# Patient Record
Sex: FEMALE | Race: WHITE | Employment: FULL TIME | URBAN - METROPOLITAN AREA
[De-identification: names, ages, dates, MRNs, and addresses within clinical notes are randomized per-mention and may not be internally consistent; named-entity substitution may affect disease eponyms.]

---

## 2018-11-29 ENCOUNTER — OFFICE VISIT (OUTPATIENT)
Dept: FAMILY MEDICINE CLINIC | Age: 53
End: 2018-11-29

## 2018-11-29 VITALS
WEIGHT: 218.2 LBS | DIASTOLIC BLOOD PRESSURE: 116 MMHG | SYSTOLIC BLOOD PRESSURE: 180 MMHG | HEIGHT: 64 IN | BODY MASS INDEX: 37.25 KG/M2 | RESPIRATION RATE: 16 BRPM | HEART RATE: 84 BPM | TEMPERATURE: 97.7 F | OXYGEN SATURATION: 96 %

## 2018-11-29 DIAGNOSIS — R20.2 PARESTHESIA OF FINGER: ICD-10-CM

## 2018-11-29 DIAGNOSIS — R20.2 PARESTHESIA OF LEFT LEG: ICD-10-CM

## 2018-11-29 DIAGNOSIS — I16.0 HYPERTENSIVE URGENCY: ICD-10-CM

## 2018-11-29 DIAGNOSIS — I16.0 HYPERTENSIVE URGENCY: Primary | ICD-10-CM

## 2018-11-29 DIAGNOSIS — Z23 ENCOUNTER FOR IMMUNIZATION: ICD-10-CM

## 2018-11-29 PROBLEM — E66.01 SEVERE OBESITY (HCC): Status: ACTIVE | Noted: 2018-11-29

## 2018-11-29 RX ORDER — PREDNISONE 50 MG/1
50 TABLET ORAL
Qty: 7 TAB | Refills: 0 | Status: SHIPPED | OUTPATIENT
Start: 2018-11-29 | End: 2018-12-12 | Stop reason: ALTCHOICE

## 2018-11-29 RX ORDER — CLONIDINE HYDROCHLORIDE 0.1 MG/1
0.1 TABLET ORAL ONCE
Qty: 1 TAB | Refills: 0
Start: 2018-11-29 | End: 2018-11-29

## 2018-11-29 RX ORDER — AMLODIPINE BESYLATE 5 MG/1
5 TABLET ORAL DAILY
Qty: 30 TAB | Refills: 2 | Status: SHIPPED | OUTPATIENT
Start: 2018-11-29 | End: 2018-12-12 | Stop reason: DRUGHIGH

## 2018-11-29 NOTE — PROGRESS NOTES
HISTORY OF PRESENT ILLNESS Bridger Ruiz is a 52 y.o. female. Ms. Marques Burgos is a 51 y/o female with a PMHx significant for headaches who presents to the clinic with HTN and tingling in her left hand and foot. Six days ago she experienced dizziness and 2 episodes of emesis. Afterwards, she began to notice the tingling on the plantar side of her L foot and along the radial nerve in her L hand. The tingling progressed in her L foot so that it now travel up to her foot. She currently has no N/V, no dizziness, no weakness, no loss of sensation, and no back pain. She takes no medications, has never smoked, and doesn't drink alcohol. She hasn't seen a doctor in years. Review of Systems Constitutional: Negative for chills and fever. Eyes: Negative for blurred vision and double vision. Respiratory: Negative for cough and shortness of breath. Cardiovascular: Negative for chest pain and palpitations. Gastrointestinal: Negative for abdominal pain, nausea and vomiting. Neurological: Positive for tingling. Negative for focal weakness, seizures and headaches. Psychiatric/Behavioral: Negative for depression and suicidal ideas. Physical Exam  
Constitutional: Vital signs are normal. She appears well-developed and well-nourished. HENT:  
Right Ear: Tympanic membrane and ear canal normal.  
Left Ear: Tympanic membrane and ear canal normal.  
Nose: Nose normal.  
Mouth/Throat: Uvula is midline, oropharynx is clear and moist and mucous membranes are normal.  
Eyes: Pupils are equal, round, and reactive to light. Cardiovascular: Normal rate and regular rhythm. Pulmonary/Chest: Effort normal and breath sounds normal. No respiratory distress. She has no wheezes. She has no rales. Abdominal: She exhibits no distension. Musculoskeletal:  
Tender lower lumbar spine and muscles. Neg SLR Neurological: No cranial nerve deficit. Coordination normal.  
Neg phalens and tinels Skin: No rash noted. Nursing note and vitals reviewed. Clonidine 0.1 mg given from stock bottle, BP coming down. Will get BP under control then investigate the tingling if it persists ASSESSMENT and PLAN 
  ICD-10-CM ICD-9-CM 1. Hypertensive urgency I30.6 958.0 METABOLIC PANEL, COMPREHENSIVE  
   TSH 3RD GENERATION  
   T4, FREE  
   LIPID PANEL 2. Paresthesia of finger Z16.0 316.2 METABOLIC PANEL, COMPREHENSIVE  
   predniSONE (DELTASONE) 50 mg tablet 3. Paresthesia of left leg W80.0 483.2 METABOLIC PANEL, COMPREHENSIVE  
   predniSONE (DELTASONE) 50 mg tablet 4.  Encounter for immunization Z23 V03.89 INFLUENZA VIRUS VAC QUAD,SPLIT,PRESV FREE SYRINGE IM  
   CT IMMUNIZ ADMIN,1 SINGLE/COMB VAC/TOXOID

## 2018-11-29 NOTE — PROGRESS NOTES
Rm 2 
Patient presents to the clinic c/o numbness and tingling of left foot and left hand mostly fingertips which all started x 1 week ago but x 1 day ago feeling it on pelvic bone Depression Screening: PHQ over the last two weeks 11/29/2018 Little interest or pleasure in doing things Not at all Feeling down, depressed, irritable, or hopeless Not at all Total Score PHQ 2 0 Learning Assessment: 
Learning Assessment 11/29/2018 PRIMARY LEARNER Patient HIGHEST LEVEL OF EDUCATION - PRIMARY LEARNER  4 YEARS OF COLLEGE  
BARRIERS PRIMARY LEARNER NONE  
CO-LEARNER CAREGIVER No  
PRIMARY LANGUAGE ENGLISH  
LEARNER PREFERENCE PRIMARY DEMONSTRATION  
ANSWERED BY patient RELATIONSHIP SELF Abuse Screening: No flowsheet data found. Health Maintenance reviewed and discussed per provider: yes Coordination of Care: 1. Have you been to the ER, urgent care clinic since your last visit? Hospitalized since your last visit? no 
 
2. Have you seen or consulted any other health care providers outside of the [address] [place] [address] since your last visit? Include any pap smears or colon screening. No 
 
Does patient want flu shot? Yes Patient received influenza vaccine 0.5ml in right deltoid. Tolerated well. No signs or symptoms of distress noted. Most current VIS given and consent signed. Clonidine 0.1 mg PO taking by patient at 0859. Blood pressure rechecked manually at 0909.

## 2018-11-29 NOTE — PATIENT INSTRUCTIONS
For the BP, go to your pharmacy and  the prescription for Norvasc and take one immediately, then every morning. If dizziness, headache occur, go to ER or urgent care center ([facility] office on [address] is open on Saturdays if you ever need to be seen on a Saturday. Their hours are 8:30-5) For the tingling, take prednisone every morning for 7 days. Recheck Monday to review BP and lab results.

## 2018-11-30 LAB
A-G RATIO,AGRAT: 1.6 RATIO (ref 1.1–2.6)
ALBUMIN SERPL-MCNC: 4.6 G/DL (ref 3.5–5)
ALP SERPL-CCNC: 114 U/L (ref 25–115)
ALT SERPL-CCNC: 32 U/L (ref 5–40)
ANION GAP SERPL CALC-SCNC: 17 MMOL/L
AST SERPL W P-5'-P-CCNC: 30 U/L (ref 10–37)
BILIRUB SERPL-MCNC: 0.4 MG/DL (ref 0.2–1.2)
BUN SERPL-MCNC: 10 MG/DL (ref 6–22)
CALCIUM SERPL-MCNC: 9.6 MG/DL (ref 8.4–10.5)
CHLORIDE SERPL-SCNC: 97 MMOL/L (ref 98–110)
CHOLEST SERPL-MCNC: 228 MG/DL (ref 110–200)
CO2 SERPL-SCNC: 26 MMOL/L (ref 20–32)
CREAT SERPL-MCNC: 0.6 MG/DL (ref 0.5–1.2)
GFRAA, 66117: >60
GFRNA, 66118: >60
GLOBULIN,GLOB: 2.9 G/DL (ref 2–4)
GLUCOSE SERPL-MCNC: 251 MG/DL (ref 70–99)
HDLC SERPL-MCNC: 3.9 MG/DL (ref 0–5)
HDLC SERPL-MCNC: 59 MG/DL (ref 40–59)
LDLC SERPL CALC-MCNC: 131 MG/DL (ref 50–99)
POTASSIUM SERPL-SCNC: 4.3 MMOL/L (ref 3.5–5.5)
PROT SERPL-MCNC: 7.5 G/DL (ref 6.4–8.3)
SODIUM SERPL-SCNC: 140 MMOL/L (ref 133–145)
T4 FREE SERPL-MCNC: 1.3 NG/DL (ref 0.9–1.8)
TRIGL SERPL-MCNC: 191 MG/DL (ref 40–149)
TSH SERPL DL<=0.005 MIU/L-ACNC: 2.09 MCU/ML (ref 0.27–4.2)
VLDLC SERPL CALC-MCNC: 38 MG/DL (ref 8–30)

## 2018-12-03 ENCOUNTER — OFFICE VISIT (OUTPATIENT)
Dept: FAMILY MEDICINE CLINIC | Age: 53
End: 2018-12-03

## 2018-12-03 VITALS
HEART RATE: 104 BPM | DIASTOLIC BLOOD PRESSURE: 110 MMHG | OXYGEN SATURATION: 97 % | WEIGHT: 219 LBS | BODY MASS INDEX: 37.39 KG/M2 | HEIGHT: 64 IN | TEMPERATURE: 98.1 F | SYSTOLIC BLOOD PRESSURE: 180 MMHG | RESPIRATION RATE: 20 BRPM

## 2018-12-03 DIAGNOSIS — I10 UNCONTROLLED HYPERTENSION: ICD-10-CM

## 2018-12-03 DIAGNOSIS — E66.01 SEVERE OBESITY (HCC): ICD-10-CM

## 2018-12-03 DIAGNOSIS — I16.0 HYPERTENSIVE URGENCY: Primary | ICD-10-CM

## 2018-12-03 DIAGNOSIS — E11.65 UNCONTROLLED TYPE 2 DIABETES MELLITUS WITH HYPERGLYCEMIA (HCC): ICD-10-CM

## 2018-12-03 LAB — HBA1C MFR BLD HPLC: 9.7 %

## 2018-12-03 RX ORDER — CLONIDINE HYDROCHLORIDE 0.1 MG/1
0.1 TABLET ORAL ONCE
Qty: 1 TAB | Refills: 0
Start: 2018-12-03 | End: 2018-12-03

## 2018-12-03 RX ORDER — CLONIDINE HYDROCHLORIDE 0.1 MG/1
0.1 TABLET ORAL 2 TIMES DAILY
Qty: 60 TAB | Refills: 1 | Status: SHIPPED | OUTPATIENT
Start: 2018-12-03 | End: 2018-12-12 | Stop reason: DRUGHIGH

## 2018-12-03 NOTE — PROGRESS NOTES
HISTORY OF PRESENT ILLNESS Ousmane James is a 52 y.o. female. Ms. Milli Cheney is here for follow up of her abnormal labs and extremely high BP. She is taking the amlodipine as prescribed. She has no headache, blurred vision, dizziness but still has the tingling in the side of her hand. Kidney function tests were normal last week, but blood sugar was 251. She denies increased thirst. 
 
 
 
Review of Systems Constitutional: Negative for chills and fever. Eyes: Negative for blurred vision and double vision. Respiratory: Negative for cough and shortness of breath. Cardiovascular: Negative for chest pain and palpitations. Gastrointestinal: Negative for abdominal pain, nausea and vomiting. Neurological: Negative for headaches. Psychiatric/Behavioral: Negative for depression and suicidal ideas. Physical Exam  
Constitutional: Vital signs are normal. She appears well-developed and well-nourished. HENT:  
Right Ear: Tympanic membrane and ear canal normal.  
Left Ear: Tympanic membrane and ear canal normal.  
Nose: Nose normal.  
Mouth/Throat: Uvula is midline, oropharynx is clear and moist and mucous membranes are normal.  
Eyes: Pupils are equal, round, and reactive to light. Neck: No thyromegaly present. Cardiovascular: Normal rate and regular rhythm. Pulmonary/Chest: Effort normal and breath sounds normal. No respiratory distress. She has no wheezes. She has no rales. Abdominal: She exhibits no distension. Lymphadenopathy:  
  She has no cervical adenopathy. Skin: No rash noted. Psychiatric: She has a normal mood and affect. Her behavior is normal.  
Nursing note and vitals reviewed. Results for orders placed or performed in visit on 12/03/18 AMB POC HEMOGLOBIN A1C Result Value Ref Range Hemoglobin A1c (POC) 9.7 % ASSESSMENT and PLAN 
  ICD-10-CM ICD-9-CM 1. Hypertensive urgency I16.0 401.9 2. Uncontrolled hypertension I10 401.9 REFERRAL TO CARDIOLOGY 3. Uncontrolled type 2 diabetes mellitus with hyperglycemia ([facility]) E11.65 250.02 AMB POC HEMOGLOBIN A1C  
4. Severe obesity ([facility]) E66.01 278.01 Clonidine given, BP down 15 minutes after. See orders.

## 2018-12-03 NOTE — PATIENT INSTRUCTIONS
Increase amlodipine to 10 mg every morning and start clonidine 0.1 mg twice daily this evening. I am also referring you to cardiology because of the BP being so high, and we don't know how long it's been like that. For the diabetes, begin [place] every morning. Recheck 1 week.

## 2018-12-03 NOTE — PROGRESS NOTES
Pt:1 
 
Chief Complaint Patient presents with  
 Hand Pain  
  pt was seen on 11/29 for tingling in left hand, pt states that hand is no better today  Hypertension Depression Screening: PHQ over the last two weeks 12/3/2018 11/29/2018 Little interest or pleasure in doing things Not at all Not at all Feeling down, depressed, irritable, or hopeless Not at all Not at all Total Score PHQ 2 0 0 Learning Assessment: 
Learning Assessment 11/29/2018 PRIMARY LEARNER Patient HIGHEST LEVEL OF EDUCATION - PRIMARY LEARNER  4 YEARS OF COLLEGE  
BARRIERS PRIMARY LEARNER NONE  
CO-LEARNER CAREGIVER No  
PRIMARY LANGUAGE ENGLISH  
LEARNER PREFERENCE PRIMARY DEMONSTRATION  
ANSWERED BY patient RELATIONSHIP SELF Abuse Screening: No flowsheet data found. Health Maintenance reviewed and discussed per provider: yes Coordination of Care: 1. Have you been to the ER, urgent care clinic since your last visit? Hospitalized since your last visit? no 
 
2. Have you seen or consulted any other health care providers outside of the [facility] since your last visit? Include any pap smears or colon screening. no 
 
 
Pt was given 0.1mg Clonidine in office Lot # [id number] PIN#[id number]

## 2018-12-05 ENCOUNTER — DOCUMENTATION ONLY (OUTPATIENT)
Dept: FAMILY MEDICINE CLINIC | Age: 53
End: 2018-12-05

## 2018-12-05 NOTE — PROGRESS NOTES
Prior Authorization for Lynn Ramirez started on 12/5/2018 via covermymeds. Awaiting reply from pt's insurance company via fax/e-mail. Allow 48-72 hours.

## 2018-12-06 ENCOUNTER — TELEPHONE (OUTPATIENT)
Dept: FAMILY MEDICINE CLINIC | Age: 53
End: 2018-12-06

## 2018-12-06 NOTE — TELEPHONE ENCOUNTER
Stated that she went to the pharmacy to  the prescription of Georgesbebo Monaes and they told her that it needed to get approved or prior auth. Patient wanted to let us know so that she can get her medication.

## 2018-12-12 ENCOUNTER — OFFICE VISIT (OUTPATIENT)
Dept: FAMILY MEDICINE CLINIC | Age: 53
End: 2018-12-12

## 2018-12-12 VITALS
TEMPERATURE: 98.2 F | SYSTOLIC BLOOD PRESSURE: 165 MMHG | DIASTOLIC BLOOD PRESSURE: 97 MMHG | HEART RATE: 93 BPM | BODY MASS INDEX: 36.54 KG/M2 | OXYGEN SATURATION: 99 % | RESPIRATION RATE: 18 BRPM | WEIGHT: 214 LBS | HEIGHT: 64 IN

## 2018-12-12 DIAGNOSIS — I10 ESSENTIAL HYPERTENSION: Primary | ICD-10-CM

## 2018-12-12 DIAGNOSIS — E11.65 UNCONTROLLED TYPE 2 DIABETES MELLITUS WITH HYPERGLYCEMIA (HCC): ICD-10-CM

## 2018-12-12 DIAGNOSIS — E66.01 SEVERE OBESITY (HCC): ICD-10-CM

## 2018-12-12 RX ORDER — BLOOD-GLUCOSE METER
EACH MISCELLANEOUS
Qty: 1 EACH | Refills: 0 | Status: SHIPPED | OUTPATIENT
Start: 2018-12-12

## 2018-12-12 RX ORDER — METFORMIN HYDROCHLORIDE 500 MG/1
500 TABLET ORAL 2 TIMES DAILY WITH MEALS
Qty: 60 TAB | Refills: 2 | Status: SHIPPED | OUTPATIENT
Start: 2018-12-12 | End: 2019-03-05 | Stop reason: SDUPTHER

## 2018-12-12 RX ORDER — AMLODIPINE BESYLATE 10 MG/1
10 TABLET ORAL
Qty: 90 TAB | Refills: 1 | Status: SHIPPED | OUTPATIENT
Start: 2018-12-12 | End: 2019-04-22 | Stop reason: SDUPTHER

## 2018-12-12 RX ORDER — CLONIDINE HYDROCHLORIDE 0.2 MG/1
0.2 TABLET ORAL 2 TIMES DAILY
Qty: 60 TAB | Refills: 2 | Status: SHIPPED | OUTPATIENT
Start: 2018-12-12 | End: 2019-03-05 | Stop reason: SDUPTHER

## 2018-12-12 NOTE — PROGRESS NOTES
HISTORY OF PRESENT ILLNESS  Theodora Blakely is a 52 y.o. female. Ms. Krista Healy is here for follow up of her BP, and T2D. She has been taking 10 mg of Amlodipine daily and Clonidine 0.1 mg BID for her BP. She hasn't checked her BP anywhere though. It's better today than it has been, but still not near goal.   My nurse sent the prior auth for Umberto Thomas to her pharmacy a week ago, she still hasn't heard anything from them. She was asked to call her insurance company. I haven't ordered her a meter yet, will do that now. Review of Systems   Constitutional: Negative for chills and fever. HENT: Negative for congestion, ear pain and sore throat. Eyes: Negative for discharge and redness. Respiratory: Negative for cough and shortness of breath. Cardiovascular: Negative for chest pain, palpitations and orthopnea. Gastrointestinal: Negative for abdominal pain, nausea and vomiting. Genitourinary: Negative for frequency and urgency. Skin: Negative for rash. Neurological: Negative for weakness and headaches. Endo/Heme/Allergies: Does not bruise/bleed easily. Physical Exam   Constitutional: Vital signs are normal. She appears well-developed and well-nourished. HENT:   Right Ear: Tympanic membrane and ear canal normal.   Left Ear: Tympanic membrane and ear canal normal.   Nose: Nose normal.   Mouth/Throat: Uvula is midline, oropharynx is clear and moist and mucous membranes are normal.   Eyes: Pupils are equal, round, and reactive to light. Neck: No thyromegaly present. Cardiovascular: Normal rate, regular rhythm and normal heart sounds. Pulmonary/Chest: Effort normal and breath sounds normal. No respiratory distress. She has no wheezes. She has no rales. Abdominal: She exhibits no distension. There is no tenderness. Lymphadenopathy:     She has no cervical adenopathy. Skin: No rash noted. Psychiatric: Her behavior is normal.   Nursing note and vitals reviewed.       ASSESSMENT and PLAN    ICD-10-CM ICD-9-CM    1. Essential hypertension I10 401.9    2. Uncontrolled type 2 diabetes mellitus with hyperglycemia (HCC) E11.65 250.02 Blood-Glucose Meter (CONTOUR NEXT METER) misc      glucose blood VI test strips (CONTOUR NEXT TEST STRIPS) strip      metFORMIN (GLUCOPHAGE) 500 mg tablet   3. Severe obesity ([address] E66.01 278.01        Will increase Clonidine and work on getting her [place], in addition to Metformin.  She was advised to increase exercise and info about diabetic diet given to her

## 2018-12-12 NOTE — PROGRESS NOTES
Rm:1    Chief Complaint   Patient presents with    Diabetes    Hypertension     Depression Screening:  PHQ over the last two weeks 12/12/2018 12/3/2018 11/29/2018   Little interest or pleasure in doing things Not at all Not at all Not at all   Feeling down, depressed, irritable, or hopeless Not at all Not at all Not at all   Total Score PHQ 2 0 0 0       Learning Assessment:  Learning Assessment 11/29/2018   PRIMARY LEARNER Patient   HIGHEST LEVEL OF EDUCATION - PRIMARY LEARNER  4 YEARS OF COLLEGE   BARRIERS PRIMARY LEARNER NONE   CO-LEARNER CAREGIVER No   PRIMARY LANGUAGE ENGLISH   LEARNER PREFERENCE PRIMARY DEMONSTRATION   ANSWERED BY patient   RELATIONSHIP SELF       Abuse Screening:  No flowsheet data found. Health Maintenance reviewed and discussed per provider: yes     Coordination of Care:    1. Have you been to the ER, urgent care clinic since your last visit? Hospitalized since your last visit? no    2. Have you seen or consulted any other health care providers outside of the [address] [place] [address] since your last visit? Include any pap smears or colon screening.  no

## 2018-12-12 NOTE — PATIENT INSTRUCTIONS
I am increasing your clonidine to 0.2 mg twice daily. Save your 0.1 pills though, if your BP comes down once you start Aristides Escalante, we will cut you back to 0.1 again. Work on getting some exercise in your daily routine. Walking is fine. Find a pharmacy with a BP cuff and check your BP twice a week-write down the readings for me. Recheck if your bottom number isn't coming down below 100 the majority of the time. If your BP is fine, recheck 6 weeks after starting Farxiga. I am also putting you on a medication called metformin for the diabetes. Take this twice daily, before breakfast and dinner         Learning About Diabetes Food Guidelines  Your Care Instructions    Meal planning is important to manage diabetes. It helps keep your blood sugar at a target level (which you set with your doctor). You don't have to eat special foods. You can eat what your family eats, including sweets once in a while. But you do have to pay attention to how often you eat and how much you eat of certain foods. You may want to work with a dietitian or a certified diabetes educator (CDE) to help you plan meals and snacks. A dietitian or CDE can also help you lose weight if that is one of your goals. What should you know about eating carbs? Managing the amount of carbohydrate (carbs) you eat is an important part of healthy meals when you have diabetes. Carbohydrate is found in many foods. · Learn which foods have carbs. And learn the amounts of carbs in different foods. ? Bread, cereal, pasta, and rice have about 15 grams of carbs in a serving. A serving is 1 slice of bread (1 ounce), ½ cup of cooked cereal, or 1/3 cup of cooked pasta or rice. ? Fruits have 15 grams of carbs in a serving. A serving is 1 small fresh fruit, such as an apple or orange; ½ of a banana; ½ cup of cooked or canned fruit; ½ cup of fruit juice; 1 cup of melon or raspberries; or 2 tablespoons of dried fruit.   ? Milk and no-sugar-added yogurt have 15 grams of carbs in a serving. A serving is 1 cup of milk or 2/3 cup of no-sugar-added yogurt. ? Starchy vegetables have 15 grams of carbs in a serving. A serving is ½ cup of mashed potatoes or sweet potato; 1 cup winter squash; ½ of a small baked potato; ½ cup of cooked beans; or ½ cup cooked corn or green peas. · Learn how much carbs to eat each day and at each meal. A dietitian or CDE can teach you how to keep track of the amount of carbs you eat. This is called carbohydrate counting. · If you are not sure how to count carbohydrate grams, use the Plate Method to plan meals. It is a good, quick way to make sure that you have a balanced meal. It also helps you spread carbs throughout the day. ? Divide your plate by types of foods. Put non-starchy vegetables on half the plate, meat or other protein food on one-quarter of the plate, and a grain or starchy vegetable in the final quarter of the plate. To this you can add a small piece of fruit and 1 cup of milk or yogurt, depending on how many carbs you are supposed to eat at a meal.  · Try to eat about the same amount of carbs at each meal. Do not \"save up\" your daily allowance of carbs to eat at one meal.  · Proteins have very little or no carbs per serving. Examples of proteins are beef, chicken, turkey, fish, eggs, tofu, cheese, cottage cheese, and peanut butter. A serving size of meat is 3 ounces, which is about the size of a deck of cards. Examples of meat substitute serving sizes (equal to 1 ounce of meat) are 1/4 cup of cottage cheese, 1 egg, 1 tablespoon of peanut butter, and ½ cup of tofu. How can you eat out and still eat healthy? · Learn to estimate the serving sizes of foods that have carbohydrate. If you measure food at home, it will be easier to estimate the amount in a serving of restaurant food. · If the meal you order has too much carbohydrate (such as potatoes, corn, or baked beans), ask to have a low-carbohydrate food instead.  Ask for a salad or green vegetables. · If you use insulin, check your blood sugar before and after eating out to help you plan how much to eat in the future. · If you eat more carbohydrate at a meal than you had planned, take a walk or do other exercise. This will help lower your blood sugar. What else should you know? · Limit saturated fat, such as the fat from meat and dairy products. This is a healthy choice because people who have diabetes are at higher risk of heart disease. So choose lean cuts of meat and nonfat or low-fat dairy products. Use olive or canola oil instead of butter or shortening when cooking. · Don't skip meals. Your blood sugar may drop too low if you skip meals and take insulin or certain medicines for diabetes. · Check with your doctor before you drink alcohol. Alcohol can cause your blood sugar to drop too low. Alcohol can also cause a bad reaction if you take certain diabetes medicines. Follow-up care is a key part of your treatment and safety. Be sure to make and go to all appointments, and call your doctor if you are having problems. It's also a good idea to know your test results and keep a list of the medicines you take. Where can you learn more? Go to http://dominick-lachelle.info/. Enter H441 in the search box to learn more about \"Learning About Diabetes Food Guidelines. \"  Current as of: December 7, 2017  Content Version: 11.8  © 3234-3418 [employer]. Care instructions adapted under license by [employer] (which disclaims liability or warranty for this information). If you have questions about a medical condition or this instruction, always ask your healthcare professional. [address] any warranty or liability for your use of this information.

## 2018-12-17 NOTE — TELEPHONE ENCOUNTER
PA was processed on 12/5/18 thru Covermymeds. I received a Curse message stating the pt spoke with [employer] and advised her that they don't accept PA's thru 3rd party and that I needed to fill out a PA form. I contacted [employer] and was on hold for about 30mins before reaching a live agent, when I finally did speak with someone the agent advised me that she would take a message and send to PA rep. I asked if the form could just be faxed and she advised that she wasn't aware of what form I was speaking of, so I have to wait on a return call or either the form in order for the PA to be processed for the [place].  I called pt the advise of status and there was no answer, left  for return call

## 2018-12-18 ENCOUNTER — DOCUMENTATION ONLY (OUTPATIENT)
Dept: FAMILY MEDICINE CLINIC | Age: 53
End: 2018-12-18

## 2018-12-18 NOTE — PROGRESS NOTES
Received form from [place], filled out, had Dr. Ricardo Mendiola sign and date, then re-faxed to [place].     Awaiting approval for the [facility]

## 2019-01-07 ENCOUNTER — OFFICE VISIT (OUTPATIENT)
Dept: CARDIOLOGY CLINIC | Age: 54
End: 2019-01-07

## 2019-01-07 VITALS
WEIGHT: 202 LBS | OXYGEN SATURATION: 99 % | SYSTOLIC BLOOD PRESSURE: 158 MMHG | DIASTOLIC BLOOD PRESSURE: 92 MMHG | BODY MASS INDEX: 34.67 KG/M2 | HEART RATE: 96 BPM

## 2019-01-07 DIAGNOSIS — I10 ESSENTIAL HYPERTENSION: ICD-10-CM

## 2019-01-07 DIAGNOSIS — I10 HYPERTENSION, UNSPECIFIED TYPE: Primary | ICD-10-CM

## 2019-01-07 DIAGNOSIS — E66.01 SEVERE OBESITY (HCC): ICD-10-CM

## 2019-01-07 DIAGNOSIS — E11.8 CONTROLLED TYPE 2 DIABETES MELLITUS WITH COMPLICATION, WITHOUT LONG-TERM CURRENT USE OF INSULIN (HCC): ICD-10-CM

## 2019-01-07 DIAGNOSIS — E78.5 DYSLIPIDEMIA: ICD-10-CM

## 2019-01-07 NOTE — PROGRESS NOTES
1. Have you been to the ER, urgent care clinic since your last visit? Hospitalized since your last visit? No  
 
2. Have you seen or consulted any other health care providers outside of the [address] [place] [address] since your last visit? Include any pap smears or colon screening.   No

## 2019-01-07 NOTE — PATIENT INSTRUCTIONS
Paulette Bruner will call to schedule your testing within 24-48 hours. If you do not hear from her, then please call her directly at [phone]. All testing/lab work is completed in [address] [place] [address] 150  
at [facility] DASH Diet: Care Instructions Your Care Instructions The DASH diet is an eating plan that can help lower your blood pressure. DASH stands for Dietary Approaches to Stop Hypertension. Hypertension is high blood pressure. The DASH diet focuses on eating foods that are high in calcium, potassium, and magnesium. These nutrients can lower blood pressure. The foods that are highest in these nutrients are fruits, vegetables, low-fat dairy products, nuts, seeds, and legumes. But taking calcium, potassium, and magnesium supplements instead of eating foods that are high in those nutrients does not have the same effect. The DASH diet also includes whole grains, fish, and poultry. The DASH diet is one of several lifestyle changes your doctor may recommend to lower your high blood pressure. Your doctor may also want you to decrease the amount of sodium in your diet. Lowering sodium while following the DASH diet can lower blood pressure even further than just the DASH diet alone. Follow-up care is a key part of your treatment and safety. Be sure to make and go to all appointments, and call your doctor if you are having problems. It's also a good idea to know your test results and keep a list of the medicines you take. How can you care for yourself at home? Following the DASH diet · Eat 4 to 5 servings of fruit each day. A serving is 1 medium-sized piece of fruit, ½ cup chopped or canned fruit, 1/4 cup dried fruit, or 4 ounces (½ cup) of fruit juice. Choose fruit more often than fruit juice. · Eat 4 to 5 servings of vegetables each day.  A serving is 1 cup of lettuce or raw leafy vegetables, ½ cup of chopped or cooked vegetables, or 4 ounces (½ cup) of vegetable juice. Choose vegetables more often than vegetable juice. · Get 2 to 3 servings of low-fat and fat-free dairy each day. A serving is 8 ounces of milk, 1 cup of yogurt, or 1 ½ ounces of cheese. · Eat 6 to 8 servings of grains each day. A serving is 1 slice of bread, 1 ounce of dry cereal, or ½ cup of cooked rice, pasta, or cooked cereal. Try to choose whole-grain products as much as possible. · Limit lean meat, poultry, and fish to 2 servings each day. A serving is 3 ounces, about the size of a deck of cards. · Eat 4 to 5 servings of nuts, seeds, and legumes (cooked dried beans, lentils, and split peas) each week. A serving is 1/3 cup of nuts, 2 tablespoons of seeds, or ½ cup of cooked beans or peas. · Limit fats and oils to 2 to 3 servings each day. A serving is 1 teaspoon of vegetable oil or 2 tablespoons of salad dressing. · Limit sweets and added sugars to 5 servings or less a week. A serving is 1 tablespoon jelly or jam, ½ cup sorbet, or 1 cup of lemonade. · Eat less than 2,300 milligrams (mg) of sodium a day. If you limit your sodium to 1,500 mg a day, you can lower your blood pressure even more. Tips for success · Start small. Do not try to make dramatic changes to your diet all at once. You might feel that you are missing out on your favorite foods and then be more likely to not follow the plan. Make small changes, and stick with them. Once those changes become habit, add a few more changes. · Try some of the following: ? Make it a goal to eat a fruit or vegetable at every meal and at snacks. This will make it easy to get the recommended amount of fruits and vegetables each day. ? Try yogurt topped with fruit and nuts for a snack or healthy dessert. ? Add lettuce, tomato, cucumber, and onion to sandwiches. ? Combine a ready-made pizza crust with low-fat mozzarella cheese and lots of vegetable toppings.  Try using tomatoes, squash, spinach, broccoli, carrots, cauliflower, and onions. ? Have a variety of cut-up vegetables with a low-fat dip as an appetizer instead of chips and dip. ? Sprinkle sunflower seeds or chopped almonds over salads. Or try adding chopped walnuts or almonds to cooked vegetables. ? Try some vegetarian meals using beans and peas. Add garbanzo or kidney beans to salads. Make burritos and tacos with mashed leija beans or black beans. Where can you learn more? Go to http://[employer].info/. Enter Z605 in the search box to learn more about \"DASH Diet: Care Instructions. \" Current as of: December 6, 2017 Content Version: 11.8 © 1210-6516 [employer]. Care instructions adapted under license by [employer] (which disclaims liability or warranty for this information). If you have questions about a medical condition or this instruction, always ask your healthcare professional. [address] any warranty or liability for your use of this information.

## 2019-01-07 NOTE — PROGRESS NOTES
Subjective:  
   Pura Delgado is in the office today for cardiac evaluation. She is a 22-year-old woman that was recently diagnosed with hypertension (November 2018). She is also diagnosed with diabetes at the same time. She has had no specific complaints. She has had no chest pain or shortness of breath. She walks a mile to 1-1/2 miles every day in her neighborhood without difficulty. She has had no palpitations, near-syncope or syncope. She has had no peripheral swelling. For hypertension, she was started on amlodipine and clonidine. She brought with her a blood pressure log that she is kept over the last couple of weeks. The blood pressures seem to be trending downward. She has had no headaches. There is no history of excessive alcohol. Patient's cardiac risk factors are dyslipidemia, diabetes mellitus, hypertension. Patient Active Problem List  
 Diagnosis Date Noted  Essential hypertension 01/07/2019  Controlled type 2 diabetes mellitus with complication, without long-term current use of insulin ([facility] [address] 01/07/2019  Dyslipidemia 01/07/2019  Severe obesity ([facility] [address] 11/29/2018 Current Outpatient Medications Medication Sig Dispense Refill  empagliflozin (JARDIANCE) 10 mg tablet Take 1 Tab by mouth daily. 30 Tab 5  Blood-Glucose Meter (CONTOUR NEXT METER) misc Check blood sugar twice daily 1 Each 0  
 glucose blood VI test strips (CONTOUR NEXT TEST STRIPS) strip Check blood sugar twice daily 200 Strip 3  cloNIDine HCl (CATAPRES) 0.2 mg tablet Take 1 Tab by mouth two (2) times a day. 60 Tab 2  
 metFORMIN (GLUCOPHAGE) 500 mg tablet Take 1 Tab by mouth two (2) times daily (with meals). 60 Tab 2  
 amLODIPine (NORVASC) 10 mg tablet Take 1 Tab by mouth every morning. 90 Tab 1 No Known Allergies No past medical history on file. No past surgical history on file. Family History Problem Relation Age of Onset  No Known Problems Mother  No Known Problems Father Social History Tobacco Use Smoking Status Never Smoker Smokeless Tobacco Never Used Review of Systems, additional: 
Constitutional: negative Eyes: negative Respiratory: negative Cardiovascular: negative Gastrointestinal: negative Musculoskeletal:negative Neurological: negative Behvioral/Psych: negative Endocrine: negative ENT: negative Objective:  
 
Visit Vitals BP (!) 158/92 Pulse 96 Wt 202 lb (91.6 kg) SpO2 99% BMI 34.67 kg/m² General:  alert, cooperative, no distress Chest Wall: inspection normal - no chest wall deformities or tenderness, respiratory effort normal  
Lung: clear to auscultation bilaterally Heart:  normal rate and regular rhythm, S1 and S2 normal, no murmurs noted, no gallops noted, no JVD Abdomen: soft, non-tender. Bowel sounds normal. No masses,  no organomegaly Extremities: extremities normal, atraumatic, no cyanosis or edema Skin: no rashes Neuro: alert, oriented, normal speech, no focal findings or movement disorder noted EK19. Sinus rhythm. Nondiagnostic ST and T wave abnormalities, lateral 
 
Assessment/Plan: ICD-10-CM ICD-9-CM 1. Hypertension, unspecified type, patient asked to continue with blood pressure log. An echocardiogram will be ordered to aid in selection of antihypertensive agents. Will likely desire to add a ARB or ACE inhibitor. Return in 6 weeks I10 401.9 AMB POC EKG ROUTINE W/ 12 LEADS, INTER & REP  
   ECHO ADULT COMPLETE 2. Dyslipidemia E78.5 272.4 3. Severe obesity ([address] E66.01 278.01   
4. Controlled type 2 diabetes mellitus with complication, without long-term current use of insulin (HCC) E11.8 250.90   
5. Essential hypertension, moderately elevated in the office today. I10 401.9

## 2019-01-12 ENCOUNTER — HOSPITAL ENCOUNTER (OUTPATIENT)
Dept: NON INVASIVE DIAGNOSTICS | Age: 54
Discharge: HOME | End: 2019-01-12
Attending: INTERNAL MEDICINE
Payer: COMMERCIAL

## 2019-01-12 VITALS
DIASTOLIC BLOOD PRESSURE: 78 MMHG | SYSTOLIC BLOOD PRESSURE: 132 MMHG | WEIGHT: 202 LBS | HEIGHT: 64 IN | BODY MASS INDEX: 34.49 KG/M2

## 2019-01-12 DIAGNOSIS — I10 HYPERTENSION, UNSPECIFIED TYPE: ICD-10-CM

## 2019-01-12 LAB
ECHO AO ROOT DIAM: 2.79 CM
ECHO AV PEAK GRADIENT: 0 MMHG
ECHO AV PEAK VELOCITY: 0 CM/S
ECHO LA VOL 2C: 79.23 ML (ref 22–52)
ECHO LA VOL 4C: 41.69 ML (ref 22–52)
ECHO LA VOLUME INDEX A2C: 40.33 ML/M2 (ref 16–28)
ECHO LA VOLUME INDEX A4C: 21.22 ML/M2 (ref 16–28)
ECHO LV EDV A2C: 107.1 ML
ECHO LV EDV A4C: 103.9 ML
ECHO LV EDV BP: 106.3 ML (ref 56–104)
ECHO LV EDV INDEX A4C: 52.9 ML/M2
ECHO LV EDV INDEX BP: 54.1 ML/M2
ECHO LV EDV NDEX A2C: 54.5 ML/M2
ECHO LV EJECTION FRACTION A2C: 60 %
ECHO LV EJECTION FRACTION A4C: 64 %
ECHO LV EJECTION FRACTION BIPLANE: 60.8 % (ref 55–100)
ECHO LV ESV A2C: 42.8 ML
ECHO LV ESV A4C: 37.7 ML
ECHO LV ESV BP: 41.7 ML (ref 19–49)
ECHO LV ESV INDEX A2C: 21.8 ML/M2
ECHO LV ESV INDEX A4C: 19.2 ML/M2
ECHO LV ESV INDEX BP: 21.2 ML/M2
ECHO LV INTERNAL DIMENSION DIASTOLIC: 4.57 CM (ref 3.9–5.3)
ECHO LV INTERNAL DIMENSION SYSTOLIC: 2.9 CM
ECHO LV IVSD: 1.06 CM (ref 0.6–0.9)
ECHO LV MASS 2D: 183.8 G (ref 67–162)
ECHO LV MASS INDEX 2D: 93.6 G/M2 (ref 43–95)
ECHO LV POSTERIOR WALL DIASTOLIC: 0.95 CM (ref 0.6–0.9)
ECHO LVOT DIAM: 1.84 CM
ECHO LVOT PEAK GRADIENT: 3.6 MMHG
ECHO LVOT PEAK VELOCITY: 94.42 CM/S
ECHO MV A VELOCITY: 81.53 CM/S
ECHO MV AREA PHT: 3.4 CM2
ECHO MV E DECELERATION TIME (DT): 222.5 MS
ECHO MV E VELOCITY: 0.68 CM/S
ECHO MV E/A RATIO: 0.01
ECHO MV PRESSURE HALF TIME (PHT): 64.5 MS
ECHO PVEIN A DURATION: 55.5 MS
ECHO PVEIN A VELOCITY: 21.57 CM/S

## 2019-01-12 PROCEDURE — 93306 TTE W/DOPPLER COMPLETE: CPT

## 2019-01-21 ENCOUNTER — OFFICE VISIT (OUTPATIENT)
Dept: FAMILY MEDICINE CLINIC | Age: 54
End: 2019-01-21

## 2019-01-21 VITALS
OXYGEN SATURATION: 98 % | SYSTOLIC BLOOD PRESSURE: 148 MMHG | TEMPERATURE: 98.5 F | HEART RATE: 98 BPM | RESPIRATION RATE: 18 BRPM | BODY MASS INDEX: 33.63 KG/M2 | DIASTOLIC BLOOD PRESSURE: 90 MMHG | HEIGHT: 64 IN | WEIGHT: 197 LBS

## 2019-01-21 DIAGNOSIS — R20.2 PARESTHESIA OF FINGER: ICD-10-CM

## 2019-01-21 DIAGNOSIS — M54.32 SCIATICA OF LEFT SIDE: ICD-10-CM

## 2019-01-21 DIAGNOSIS — I10 ESSENTIAL HYPERTENSION: Primary | ICD-10-CM

## 2019-01-21 NOTE — PROGRESS NOTES
HISTORY OF PRESENT ILLNESS Reggie Dee is a 52 y.o. female. Ms. Lety Murray arrives to the clinic today for a follow-up of hypertension and numbness, she still has consistent numbness and tingling of her left upper and lower extremity. She was last seen at the clinic in December 2018 where she was put on amlodipine, clonidine, and metformin. She has had no adverse reactions to her new medications and she has seen a decrease in her blood pressures. She has been having numbness and tingling in her left upper and lower extremity since November 2018. Her upper extremity numbness feels like there is a band around her upper L arm with numbness and tingling felt in the palm of her hand and fingertips. In her L lower extremity, there is pain and \"tightness\" in her glute that radiates to the posterior thigh. She reports that she starts limping when she is walking 2+ miles, an activity she used to do with no problem. Patient denies recent trauma, fevers, changes in bladder/bowel habits. She saw Dr. Carolyn Aguilar, had an echo done (normal) and is due to see him in follow up next month. Review of Systems Constitutional: Negative for chills and fever. HENT: Negative for hearing loss. Eyes: Negative for blurred vision and double vision. Respiratory: Negative for cough and shortness of breath. Cardiovascular: Negative for chest pain and palpitations. Gastrointestinal: Negative for abdominal pain, nausea and vomiting. Musculoskeletal: Positive for back pain and myalgias. Negative for falls. Skin: Negative for rash. Neurological: Positive for tingling and sensory change. Negative for headaches. Physical Exam  
Constitutional: Vital signs are normal. She appears well-developed and well-nourished.   
HENT:  
Right Ear: Tympanic membrane and ear canal normal.  
Left Ear: Tympanic membrane and ear canal normal.  
Nose: Nose normal.  
 Mouth/Throat: Uvula is midline, oropharynx is clear and moist and mucous membranes are normal.  
Eyes: Pupils are equal, round, and reactive to light. Neck: No thyromegaly present. Cardiovascular: Normal rate and regular rhythm. Pulmonary/Chest: Effort normal and breath sounds normal. No respiratory distress. She has no wheezes. She has no rales. Abdominal: She exhibits no distension. There is no tenderness. Musculoskeletal:  
Tender L sciatic area Lymphadenopathy:  
  She has no cervical adenopathy. Neurological: She is alert. No localizing neuro findings. Skin: No rash noted. Psychiatric: She has a normal mood and affect. Nursing note and vitals reviewed. ASSESSMENT and PLAN 
  ICD-10-CM ICD-9-CM 1. Essential hypertension I10 401.9 2. Paresthesia of finger R20.2 782.0 REFERRAL TO NEUROLOGY 3. Sciatica of left side M54.32 724.3

## 2019-01-21 NOTE — PROGRESS NOTES
Rm:2 
 
Chief Complaint Patient presents with  Hypertension  Numbness  
  left side Depression Screening: PHQ over the last two weeks 1/21/2019 1/7/2019 12/12/2018 12/3/2018 11/29/2018 Little interest or pleasure in doing things Not at all Not at all Not at all Not at all Not at all Feeling down, depressed, irritable, or hopeless Not at all Not at all Not at all Not at all Not at all Total Score PHQ 2 0 0 0 0 0 Learning Assessment: 
Learning Assessment 11/29/2018 PRIMARY LEARNER Patient HIGHEST LEVEL OF EDUCATION - PRIMARY LEARNER  4 YEARS OF COLLEGE  
BARRIERS PRIMARY LEARNER NONE  
CO-LEARNER CAREGIVER No  
PRIMARY LANGUAGE ENGLISH  
LEARNER PREFERENCE PRIMARY DEMONSTRATION  
ANSWERED BY patient RELATIONSHIP SELF Abuse Screening: No flowsheet data found. Health Maintenance reviewed and discussed per provider: yes Coordination of Care: 1. Have you been to the ER, urgent care clinic since your last visit? Hospitalized since your last visit? no 
 
2. Have you seen or consulted any other health care providers outside of the [address] [place] [address] since your last visit? Include any pap smears or colon screening.  no

## 2019-01-21 NOTE — PATIENT INSTRUCTIONS
Continue same BP medications, monitor your BP occasionally, write down the readings for Dr. Duane Ralph and bring to his appointment. I am referring you to neurology because of the tingling in your L arm and leg. Recheck 3 months.

## 2019-02-20 ENCOUNTER — OFFICE VISIT (OUTPATIENT)
Dept: CARDIOLOGY CLINIC | Age: 54
End: 2019-02-20

## 2019-02-20 VITALS
BODY MASS INDEX: 32.61 KG/M2 | HEART RATE: 88 BPM | WEIGHT: 190 LBS | OXYGEN SATURATION: 98 % | DIASTOLIC BLOOD PRESSURE: 82 MMHG | SYSTOLIC BLOOD PRESSURE: 134 MMHG

## 2019-02-20 DIAGNOSIS — E11.8 CONTROLLED TYPE 2 DIABETES MELLITUS WITH COMPLICATION, WITHOUT LONG-TERM CURRENT USE OF INSULIN (HCC): ICD-10-CM

## 2019-02-20 DIAGNOSIS — E78.5 DYSLIPIDEMIA: ICD-10-CM

## 2019-02-20 DIAGNOSIS — I10 ESSENTIAL HYPERTENSION: Primary | ICD-10-CM

## 2019-02-20 NOTE — PROGRESS NOTES
1. Have you been to the ER, urgent care clinic since your last visit? Hospitalized since your last visit? No  
 
2. Have you seen or consulted any other health care providers outside of the [facility] since your last visit? Include any pap smears or colon screening.  No

## 2019-02-23 NOTE — PROGRESS NOTES
Subjective:  
   Gianna King is in the office today for cardiac evaluation. She is a 68-year-old woman that was recently diagnosed with hypertension (November 2018). She was also diagnosed with diabetes at the same time. She has had no specific complaints. She has had no chest pain or shortness of breath. She walks a mile to 1-1/2 miles every day in her neighborhood without difficulty. She has had no palpitations, near-syncope or syncope. She has had no peripheral swelling. For hypertension, she was started on amlodipine and clonidine. She brought with her a blood pressure log that she is kept over the last couple of weeks. The blood pressures seem to be trending downward. She has had no headaches. There is no history of excessive alcohol. An echocardiogram was ordered at the initial visit. She had the echocardiogram on 1/12/19. Her ventricular function is normal.  There was no significant valvular pathology. She has had no chest pain or shortness breath. She continues to walk for exercise. Patient's cardiac risk factors are dyslipidemia, diabetes mellitus, hypertension. Patient Active Problem List  
 Diagnosis Date Noted  Essential hypertension 01/07/2019  Controlled type 2 diabetes mellitus with complication, without long-term current use of insulin ([facility] [address] 01/07/2019  Dyslipidemia 01/07/2019  Severe obesity ([facility] [address] 11/29/2018 Current Outpatient Medications Medication Sig Dispense Refill  empagliflozin (JARDIANCE) 10 mg tablet Take 1 Tab by mouth daily. 30 Tab 5  Blood-Glucose Meter (CONTOUR NEXT METER) misc Check blood sugar twice daily 1 Each 0  
 glucose blood VI test strips (CONTOUR NEXT TEST STRIPS) strip Check blood sugar twice daily 200 Strip 3  cloNIDine HCl (CATAPRES) 0.2 mg tablet Take 1 Tab by mouth two (2) times a day. 60 Tab 2  
 metFORMIN (GLUCOPHAGE) 500 mg tablet Take 1 Tab by mouth two (2) times daily (with meals).  60 Tab 2  
  amLODIPine (NORVASC) 10 mg tablet Take 1 Tab by mouth every morning. 90 Tab 1 No Known Allergies No past medical history on file. No past surgical history on file. Family History Problem Relation Age of Onset  No Known Problems Mother  No Known Problems Father Social History Tobacco Use Smoking Status Never Smoker Smokeless Tobacco Never Used Review of Systems, additional: 
Constitutional: negative Eyes: negative Respiratory: negative Cardiovascular: negative Gastrointestinal: negative Musculoskeletal:negative Neurological: negative Behvioral/Psych: negative Endocrine: negative ENT: negative Objective:  
 
Visit Vitals /82 Pulse 88 Wt 190 lb (86.2 kg) SpO2 98% BMI 32.61 kg/m² General:  alert, cooperative, no distress Chest Wall: inspection normal - no chest wall deformities or tenderness, respiratory effort normal  
Lung: clear to auscultation bilaterally Heart:  normal rate and regular rhythm, S1 and S2 normal, no murmurs noted, no gallops noted, no JVD Abdomen: soft, non-tender. Bowel sounds normal. No masses,  no organomegaly Extremities: extremities normal, atraumatic, no cyanosis or edema Skin: no rashes Neuro: alert, oriented, normal speech, no focal findings or movement disorder noted EK19. Sinus rhythm. Nondiagnostic ST and T wave abnormalities, lateral 
 
Assessment/Plan: ICD-10-CM ICD-9-CM 1. Hypertension, essential.  Blood pressure controlled in the office today. Echocardiogram done 19. Normal left ventricular dimensions and function with no significant valvular pathology. No LVH noted. Would prefer to add ARB and wean  off clonidine if possible in light of her diagnosis of diabetes. I10 401.9 AMB POC EKG ROUTINE W/ 12 LEADS, INTER & REP  
   ECHO ADULT COMPLETE 2. Dyslipidemia, last  on 18. E78.5 272.4 3.  Severe obesity (HCC) E66.01 278.01   
 4. Controlled type 2 diabetes mellitus with complication, without long-term current use of insulin (HCC) E11.8 250.90   
5. Essential hypertension, stable in the office today. Recommended weight reduction and increased physical activity I10 401.9

## 2019-03-01 ENCOUNTER — HOSPITAL ENCOUNTER (OUTPATIENT)
Dept: MRI IMAGING | Age: 54
Discharge: HOME | End: 2019-03-01
Attending: PSYCHIATRY & NEUROLOGY
Payer: COMMERCIAL

## 2019-03-01 DIAGNOSIS — R20.1 HYPOESTHESIA OF SKIN: ICD-10-CM

## 2019-03-01 PROCEDURE — 70553 MRI BRAIN STEM W/O & W/DYE: CPT

## 2019-03-01 PROCEDURE — 74011250636 HC RX REV CODE- 250/636: Performed by: PSYCHIATRY & NEUROLOGY

## 2019-03-01 PROCEDURE — A9575 INJ GADOTERATE MEGLUMI 0.1ML: HCPCS | Performed by: PSYCHIATRY & NEUROLOGY

## 2019-03-01 RX ORDER — GADOTERATE MEGLUMINE 376.9 MG/ML
15 INJECTION INTRAVENOUS
Status: COMPLETED | OUTPATIENT
Start: 2019-03-01 | End: 2019-03-01

## 2019-03-01 RX ADMIN — GADOTERATE MEGLUMINE 15 ML: 376.9 INJECTION INTRAVENOUS at 20:00

## 2019-03-05 DIAGNOSIS — E11.65 UNCONTROLLED TYPE 2 DIABETES MELLITUS WITH HYPERGLYCEMIA (HCC): ICD-10-CM

## 2019-03-11 RX ORDER — METFORMIN HYDROCHLORIDE 500 MG/1
TABLET ORAL
Qty: 60 TAB | Refills: 0 | Status: SHIPPED | OUTPATIENT
Start: 2019-03-11 | End: 2019-04-08 | Stop reason: SDUPTHER

## 2019-03-11 RX ORDER — CLONIDINE HYDROCHLORIDE 0.2 MG/1
TABLET ORAL
Qty: 60 TAB | Refills: 0 | Status: SHIPPED | OUTPATIENT
Start: 2019-03-11 | End: 2019-04-08 | Stop reason: SDUPTHER

## 2019-03-25 ENCOUNTER — HOSPITAL ENCOUNTER (OUTPATIENT)
Dept: MRI IMAGING | Age: 54
Discharge: HOME | End: 2019-03-25
Attending: PSYCHIATRY & NEUROLOGY
Payer: COMMERCIAL

## 2019-03-25 VITALS — BODY MASS INDEX: 31.76 KG/M2 | WEIGHT: 185 LBS

## 2019-03-25 DIAGNOSIS — R20.1 HYPOESTHESIA OF SKIN: ICD-10-CM

## 2019-03-25 LAB — CREAT UR-MCNC: 0.6 MG/DL (ref 0.6–1.3)

## 2019-03-25 PROCEDURE — 82565 ASSAY OF CREATININE: CPT

## 2019-03-25 PROCEDURE — 72157 MRI CHEST SPINE W/O & W/DYE: CPT

## 2019-03-25 PROCEDURE — 74011636320 HC RX REV CODE- 636/320: Performed by: PSYCHIATRY & NEUROLOGY

## 2019-03-25 PROCEDURE — A9575 INJ GADOTERATE MEGLUMI 0.1ML: HCPCS | Performed by: PSYCHIATRY & NEUROLOGY

## 2019-03-25 PROCEDURE — 72156 MRI NECK SPINE W/O & W/DYE: CPT

## 2019-03-25 RX ADMIN — GADOTERATE MEGLUMINE 17 ML: 376.9 INJECTION INTRAVENOUS at 19:00

## 2019-04-05 ENCOUNTER — HOSPITAL ENCOUNTER (OUTPATIENT)
Dept: MRI IMAGING | Age: 54
Discharge: HOME | End: 2019-04-05
Attending: PSYCHIATRY & NEUROLOGY
Payer: COMMERCIAL

## 2019-04-05 VITALS — WEIGHT: 180 LBS | BODY MASS INDEX: 30.9 KG/M2

## 2019-04-05 DIAGNOSIS — I65.01 OCCLUSION AND STENOSIS OF RIGHT VERTEBRAL ARTERY: ICD-10-CM

## 2019-04-05 PROCEDURE — A9575 INJ GADOTERATE MEGLUMI 0.1ML: HCPCS | Performed by: PSYCHIATRY & NEUROLOGY

## 2019-04-05 PROCEDURE — 70544 MR ANGIOGRAPHY HEAD W/O DYE: CPT

## 2019-04-05 PROCEDURE — 70549 MR ANGIOGRAPH NECK W/O&W/DYE: CPT

## 2019-04-05 PROCEDURE — 74011250636 HC RX REV CODE- 250/636: Performed by: PSYCHIATRY & NEUROLOGY

## 2019-04-05 RX ORDER — GADOTERATE MEGLUMINE 376.9 MG/ML
18 INJECTION INTRAVENOUS
Status: COMPLETED | OUTPATIENT
Start: 2019-04-05 | End: 2019-04-05

## 2019-04-05 RX ADMIN — GADOTERATE MEGLUMINE 18 ML: 376.9 INJECTION INTRAVENOUS at 18:23

## 2019-04-22 ENCOUNTER — OFFICE VISIT (OUTPATIENT)
Dept: INTERNAL MEDICINE CLINIC | Age: 54
End: 2019-04-22

## 2019-04-22 VITALS
BODY MASS INDEX: 29.02 KG/M2 | OXYGEN SATURATION: 97 % | HEART RATE: 99 BPM | WEIGHT: 170 LBS | SYSTOLIC BLOOD PRESSURE: 144 MMHG | DIASTOLIC BLOOD PRESSURE: 83 MMHG | RESPIRATION RATE: 18 BRPM | TEMPERATURE: 98.2 F | HEIGHT: 64 IN

## 2019-04-22 DIAGNOSIS — I67.9 CEREBRAL VASCULAR DISEASE: ICD-10-CM

## 2019-04-22 DIAGNOSIS — E11.8 CONTROLLED TYPE 2 DIABETES MELLITUS WITH COMPLICATION, WITHOUT LONG-TERM CURRENT USE OF INSULIN (HCC): Primary | ICD-10-CM

## 2019-04-22 DIAGNOSIS — I10 ESSENTIAL HYPERTENSION: ICD-10-CM

## 2019-04-22 RX ORDER — CLONIDINE HYDROCHLORIDE 0.2 MG/1
0.2 TABLET ORAL 2 TIMES DAILY
Qty: 60 TAB | Refills: 0 | Status: CANCELLED | OUTPATIENT
Start: 2019-04-22

## 2019-04-22 RX ORDER — LISINOPRIL 10 MG/1
10 TABLET ORAL DAILY
Qty: 90 TAB | Refills: 3 | Status: SHIPPED | OUTPATIENT
Start: 2019-04-22 | End: 2019-10-18 | Stop reason: SDUPTHER

## 2019-04-22 RX ORDER — AMLODIPINE BESYLATE 10 MG/1
10 TABLET ORAL
Qty: 90 TAB | Refills: 1 | Status: SHIPPED | OUTPATIENT
Start: 2019-04-22 | End: 2019-10-18 | Stop reason: SDUPTHER

## 2019-04-22 RX ORDER — METFORMIN HYDROCHLORIDE 500 MG/1
500 TABLET ORAL 2 TIMES DAILY WITH MEALS
Qty: 60 TAB | Refills: 0 | Status: SHIPPED | OUTPATIENT
Start: 2019-04-22 | End: 2019-06-06 | Stop reason: SDUPTHER

## 2019-04-22 RX ORDER — ASPIRIN 325 MG
325 TABLET ORAL DAILY
COMMUNITY

## 2019-04-22 RX ORDER — CLONIDINE HYDROCHLORIDE 0.1 MG/1
0.1 TABLET ORAL 2 TIMES DAILY
Qty: 180 TAB | Refills: 0 | Status: SHIPPED | OUTPATIENT
Start: 2019-04-22 | End: 2019-07-16 | Stop reason: SDUPTHER

## 2019-04-22 RX ORDER — ATORVASTATIN CALCIUM 40 MG/1
40 TABLET, FILM COATED ORAL DAILY
Qty: 90 TAB | Refills: 3 | Status: SHIPPED | OUTPATIENT
Start: 2019-04-22 | End: 2020-04-13 | Stop reason: SDUPTHER

## 2019-04-22 NOTE — PROGRESS NOTES
HISTORY OF PRESENT ILLNESS Tony Marmolejo is a 52 y.o. female. Here for f/u of DM, HTN. DM: doing well on home monitoring. Has lost > 20 lbs with dietary changes HTN well controlled at home. Has seen cardiology. No changes but recommendation for tapering clonidine Continued numbness RUE. Has seen neurology. MRA brain/neck done: \"severe multifocal stenosis of nondominant distal 
intracranial right vertebral artery\" Review of Systems Constitutional: Negative for chills, fever and weight loss. HENT: Negative for congestion and ear pain. Eyes: Negative for blurred vision and pain. Respiratory: Negative for cough and shortness of breath. Cardiovascular: Negative for chest pain, palpitations and leg swelling. Gastrointestinal: Negative for nausea and vomiting. Genitourinary: Negative for frequency and urgency. Musculoskeletal: Negative for joint pain and myalgias. Skin: Negative for itching and rash. Neurological: Positive for sensory change. Negative for dizziness, tingling and headaches. Psychiatric/Behavioral: Negative for depression. The patient is not nervous/anxious. History reviewed. No pertinent past medical history. Patient Active Problem List  
Diagnosis Code  Severe obesity ([facility]) E66.01  
 Essential hypertension I10  
 Controlled type 2 diabetes mellitus with complication, without long-term current use of insulin ([facility]) E11.8  Dyslipidemia E78.5 Current Outpatient Medications on File Prior to Visit Medication Sig Dispense Refill  aspirin (ASPIRIN) 325 mg tablet Take 325 mg by mouth daily.  metFORMIN (GLUCOPHAGE) 500 mg tablet Take 1 Tab by mouth two (2) times daily (with meals). Please schedule appointment for future refills. 60 Tab 0  cloNIDine HCl (CATAPRES) 0.2 mg tablet Take 1 Tab by mouth two (2) times a day. Please schedule appointment for future refills.  60 Tab 0  
  empagliflozin (JARDIANCE) 10 mg tablet Take 1 Tab by mouth daily. 30 Tab 5  Blood-Glucose Meter (CONTOUR NEXT METER) misc Check blood sugar twice daily 1 Each 0  
 glucose blood VI test strips (CONTOUR NEXT TEST STRIPS) strip Check blood sugar twice daily 200 Strip 3  
 amLODIPine (NORVASC) 10 mg tablet Take 1 Tab by mouth every morning. 90 Tab 1 No current facility-administered medications on file prior to visit. No Known Allergies Physical Exam  
Constitutional: She appears well-developed and well-nourished. No distress. /83 (BP 1 Location: Left arm, BP Patient Position: Sitting)   Pulse 99   Temp 98.2 °F (36.8 °C) (Oral)   Resp 18   Ht 5' 4\" (1.626 m)   Wt 170 lb (77.1 kg)   SpO2 97%   BMI 29.18 kg/m² Eyes: EOM are normal. Right eye exhibits no discharge. Left eye exhibits no discharge. No scleral icterus. Neck: Neck supple. Cardiovascular: Normal rate, regular rhythm and normal heart sounds. Exam reveals no gallop and no friction rub. No murmur heard. Pulmonary/Chest: Effort normal and breath sounds normal. No respiratory distress. She has no wheezes. She has no rales. Musculoskeletal: She exhibits no edema or tenderness. Lymphadenopathy:  
  She has no cervical adenopathy. Neurological: She is alert. She exhibits normal muscle tone. Skin: Skin is warm and dry. Psychiatric: She has a normal mood and affect. Lab Results Component Value Date/Time Hemoglobin A1c (POC) 9.7 12/03/2018 03:23 PM  
 
Lab Results Component Value Date/Time  Sodium 140 11/29/2018 09:25 AM  
 Potassium 4.3 11/29/2018 09:25 AM  
 Chloride 97 (L) 11/29/2018 09:25 AM  
 CO2 26 11/29/2018 09:25 AM  
 Anion gap 17.0 11/29/2018 09:25 AM  
 Glucose 251 (H) 11/29/2018 09:25 AM  
 BUN 10 11/29/2018 09:25 AM  
 Creatinine 0.6 11/29/2018 09:25 AM  
 Calcium 9.6 11/29/2018 09:25 AM  
 Bilirubin, total 0.4 11/29/2018 09:25 AM  
 AST (SGOT) 30 11/29/2018 09:25 AM  
 Alk. phosphatase 114 11/29/2018 09:25 AM  
 Protein, total 7.5 11/29/2018 09:25 AM  
 Albumin 4.6 11/29/2018 09:25 AM  
 Globulin 2.9 11/29/2018 09:25 AM  
 A-G Ratio 1.6 11/29/2018 09:25 AM  
 ALT (SGPT) 32 11/29/2018 09:25 AM  
 
Lab Results Component Value Date/Time Cholesterol, total 228 (H) 11/29/2018 09:25 AM  
 HDL Cholesterol 59 11/29/2018 09:25 AM  
 LDL, calculated 131 (H) 11/29/2018 09:25 AM  
 VLDL, calculated 38 (H) 11/29/2018 09:25 AM  
 Triglyceride 191 (H) 11/29/2018 09:25 AM  
 
ASSESSMENT and PLAN 
  ICD-10-CM ICD-9-CM 1. Controlled type 2 diabetes mellitus with complication, without long-term current use of insulin (HCC) E11.8 250.90 metFORMIN (GLUCOPHAGE) 500 mg tablet  
   empagliflozin (JARDIANCE) 10 mg tablet HEMOGLOBIN A1C WITH EAG  
   HEMOGLOBIN A1C WITH EAG 2. Essential hypertension G70 435.6 METABOLIC PANEL, COMPREHENSIVE  
   CBC WITH AUTOMATED DIFF  
   CBC WITH AUTOMATED DIFF  
   METABOLIC PANEL, COMPREHENSIVE 3. BMI 29.0-29.9,adult Z68.29 V85.25 4. Cerebral vascular disease I67.9 437.9 Doing well. Will taper clonidine to 0.1mg BID. Start lisinopril and will taper off clonidine if BP remains controlled DM doing well on home monitoring. Repeat labs today.  
Will add Lipitor given cerebral vascular dz, DM

## 2019-04-22 NOTE — PATIENT INSTRUCTIONS
Learning About Statins for People With Diabetes Introduction Statins are medicines that help with your cholesterol. Cholesterol is a type of fat in your blood. If you have too much of this fat, it can build up in blood vessels. This raises your risk of heart disease, heart attack, and stroke. Many people with diabetes take statins. Diabetes can cause problems in your body that may also lead to heart disease. That means your risks of heart attack and stroke are higher when you have diabetes. Statins can lower your risk. Your doctor may prescribe a statin if: 
· You have diabetes. · AND you are age 36 to 76. Statins may help people with diabetes at other ages too. Your doctor can help you decide if statins may help you. Examples Common statins include: · Atorvastatin (Lipitor). · Pravastatin (Pravachol). · Rosuvastatin (Crestor). · Simvastatin (Zocor). Possible side effects Some people who take statins report that they have more muscle aches. But it's not clear whether these are actually a side effect of statins. Most side effects will go away if you stop taking the medicine. You may have other side effects not described here. Check the information that comes with your medicine. What to know about taking this medicine · You must take statins on a regular basis for them to work well. If you stop, your risk for heart attack and stroke may go back up. · Be safe with medicines. Take your medicines exactly as prescribed. Call your doctor if you think you are having a problem with your medicine. · If you have side effects that bother you, talk to your doctor. You may be able to take a different statin. · Check with your doctor or pharmacist before you use any other medicines. This includes over-the-counter medicines. Make sure your doctor knows all of the medicines, vitamins, herbal products, and supplements you take. Taking some medicines together can cause problems. Where can you learn more? Go to http://dominick-lachelle.info/. Enter O119 in the search box to learn more about \"Learning About Statins for People With Diabetes. \" Current as of: July 25, 2018 Content Version: 11.9 © 5953-7576 [employer]. Care instructions adapted under license by [employer] (which disclaims liability or warranty for this information). If you have questions about a medical condition or this instruction, always ask your healthcare professional. [address] any warranty or liability for your use of this information.

## 2019-04-22 NOTE — PROGRESS NOTES
Rm:17 Chief Complaint Patient presents with  Hypertension  Diabetes Depression Screening: 
3 most recent [facility] Screens 4/22/2019 2/20/2019 1/21/2019 1/21/2019 1/7/2019 12/12/2018 12/3/2018 Little interest or pleasure in doing things Not at all Not at all Not at all Not at all Not at all Not at all Not at all Feeling down, depressed, irritable, or hopeless Not at all Not at all Not at all Not at all Not at all Not at all Not at all Total Score PHQ 2 0 0 0 0 0 0 0 Learning Assessment: 
Learning Assessment 11/29/2018 PRIMARY LEARNER Patient HIGHEST LEVEL OF EDUCATION - PRIMARY LEARNER  4 YEARS OF COLLEGE  
BARRIERS PRIMARY LEARNER NONE  
CO-LEARNER CAREGIVER No  
PRIMARY LANGUAGE ENGLISH  
LEARNER PREFERENCE PRIMARY DEMONSTRATION  
ANSWERED BY patient RELATIONSHIP SELF Abuse Screening: No flowsheet data found. Health Maintenance reviewed and discussed per provider: yes Coordination of Care: 1. Have you been to the ER, urgent care clinic since your last visit? Hospitalized since your last visit? no 
 
2. Have you seen or consulted any other health care providers outside of the [address] [place] [address] since your last visit? Include any pap smears or colon screening.  no

## 2019-04-23 LAB
A-G RATIO,AGRAT: 1.6 RATIO (ref 1.1–2.6)
ABSOLUTE LYMPHOCYTE COUNT, 10803: 1.4 K/UL (ref 1–4.8)
ALBUMIN SERPL-MCNC: 4.3 G/DL (ref 3.5–5)
ALP SERPL-CCNC: 73 U/L (ref 25–115)
ALT SERPL-CCNC: 26 U/L (ref 5–40)
ANION GAP SERPL CALC-SCNC: 21 MMOL/L
AST SERPL W P-5'-P-CCNC: 27 U/L (ref 10–37)
AVG GLU, 10930: 112 MG/DL (ref 91–123)
BASOPHILS # BLD: 0 K/UL (ref 0–0.2)
BASOPHILS NFR BLD: 0 % (ref 0–2)
BILIRUB SERPL-MCNC: 0.4 MG/DL (ref 0.2–1.2)
BUN SERPL-MCNC: 14 MG/DL (ref 6–22)
CALCIUM SERPL-MCNC: 9.8 MG/DL (ref 8.4–10.5)
CHLORIDE SERPL-SCNC: 98 MMOL/L (ref 98–110)
CO2 SERPL-SCNC: 24 MMOL/L (ref 20–32)
CREAT SERPL-MCNC: 0.6 MG/DL (ref 0.5–1.2)
EOSINOPHIL # BLD: 0.4 K/UL (ref 0–0.5)
EOSINOPHIL NFR BLD: 5 % (ref 0–6)
ERYTHROCYTE [DISTWIDTH] IN BLOOD BY AUTOMATED COUNT: 16.5 % (ref 10–15.5)
GFRAA, 66117: >60
GFRNA, 66118: >60
GLOBULIN,GLOB: 2.7 G/DL (ref 2–4)
GLUCOSE SERPL-MCNC: 85 MG/DL (ref 70–99)
GRANULOCYTES,GRANS: 72 % (ref 40–75)
HBA1C MFR BLD HPLC: 5.5 % (ref 4.8–5.9)
HCT VFR BLD AUTO: 44.2 % (ref 35.1–48)
HGB BLD-MCNC: 13.9 G/DL (ref 11.7–16)
LYMPHOCYTES, LYMLT: 18 % (ref 20–45)
MCH RBC QN AUTO: 27 PG (ref 26–34)
MCHC RBC AUTO-ENTMCNC: 31 G/DL (ref 31–36)
MCV RBC AUTO: 87 FL (ref 80–95)
MONOCYTES # BLD: 0.4 K/UL (ref 0.1–1)
MONOCYTES NFR BLD: 5 % (ref 3–12)
NEUTROPHILS # BLD AUTO: 5.6 K/UL (ref 1.8–7.7)
PLATELET # BLD AUTO: 506 K/UL (ref 140–440)
PMV BLD AUTO: 11.1 FL (ref 9–13)
POTASSIUM SERPL-SCNC: 4.6 MMOL/L (ref 3.5–5.5)
PROT SERPL-MCNC: 7 G/DL (ref 6.4–8.3)
RBC # BLD AUTO: 5.1 M/UL (ref 3.8–5.2)
SODIUM SERPL-SCNC: 143 MMOL/L (ref 133–145)
WBC # BLD AUTO: 7.9 K/UL (ref 4–11)

## 2019-07-16 RX ORDER — CLONIDINE HYDROCHLORIDE 0.1 MG/1
TABLET ORAL
Qty: 180 TAB | Refills: 0 | Status: SHIPPED | OUTPATIENT
Start: 2019-07-16 | End: 2019-10-18 | Stop reason: SDUPTHER

## 2019-10-18 ENCOUNTER — OFFICE VISIT (OUTPATIENT)
Dept: FAMILY MEDICINE CLINIC | Age: 54
End: 2019-10-18

## 2019-10-18 VITALS
HEART RATE: 91 BPM | OXYGEN SATURATION: 100 % | DIASTOLIC BLOOD PRESSURE: 80 MMHG | SYSTOLIC BLOOD PRESSURE: 131 MMHG | TEMPERATURE: 97 F | RESPIRATION RATE: 16 BRPM | HEIGHT: 64 IN | BODY MASS INDEX: 24.75 KG/M2 | WEIGHT: 145 LBS

## 2019-10-18 DIAGNOSIS — I10 ESSENTIAL HYPERTENSION: ICD-10-CM

## 2019-10-18 DIAGNOSIS — Z23 ENCOUNTER FOR IMMUNIZATION: ICD-10-CM

## 2019-10-18 DIAGNOSIS — Z12.39 BREAST CANCER SCREENING: ICD-10-CM

## 2019-10-18 DIAGNOSIS — E78.5 DYSLIPIDEMIA: ICD-10-CM

## 2019-10-18 DIAGNOSIS — Z78.0 POSTMENOPAUSAL: ICD-10-CM

## 2019-10-18 DIAGNOSIS — Z00.00 ANNUAL PHYSICAL EXAM: Primary | ICD-10-CM

## 2019-10-18 DIAGNOSIS — E11.8 CONTROLLED TYPE 2 DIABETES MELLITUS WITH COMPLICATION, WITHOUT LONG-TERM CURRENT USE OF INSULIN (HCC): ICD-10-CM

## 2019-10-18 DIAGNOSIS — Z12.11 COLON CANCER SCREENING: ICD-10-CM

## 2019-10-18 DIAGNOSIS — Z11.59 NEED FOR HEPATITIS C SCREENING TEST: ICD-10-CM

## 2019-10-18 RX ORDER — METFORMIN HYDROCHLORIDE 500 MG/1
TABLET ORAL
Qty: 180 TAB | Refills: 1 | Status: SHIPPED | OUTPATIENT
Start: 2019-10-18 | End: 2020-05-13

## 2019-10-18 RX ORDER — CLONIDINE HYDROCHLORIDE 0.1 MG/1
0.1 TABLET ORAL DAILY
Qty: 30 TAB | Refills: 0 | Status: SHIPPED | OUTPATIENT
Start: 2019-10-18 | End: 2020-02-28 | Stop reason: ALTCHOICE

## 2019-10-18 RX ORDER — LISINOPRIL 10 MG/1
10 TABLET ORAL DAILY
Qty: 90 TAB | Refills: 1 | Status: SHIPPED | OUTPATIENT
Start: 2019-10-18 | End: 2020-04-13 | Stop reason: SDUPTHER

## 2019-10-18 RX ORDER — AMLODIPINE BESYLATE 10 MG/1
10 TABLET ORAL
Qty: 90 TAB | Refills: 1 | Status: SHIPPED | OUTPATIENT
Start: 2019-10-18 | End: 2020-05-13

## 2019-10-18 NOTE — PATIENT INSTRUCTIONS
High Cholesterol: Care Instructions Your Care Instructions Cholesterol is a type of fat in your blood. It is needed for many body functions, such as making new cells. Cholesterol is made by your body. It also comes from food you eat. High cholesterol means that you have too much of the fat in your blood. This raises your risk of a heart attack and stroke. LDL and HDL are part of your total cholesterol. LDL is the \"bad\" cholesterol. High LDL can raise your risk for heart disease, heart attack, and stroke. HDL is the \"good\" cholesterol. It helps clear bad cholesterol from the body. High HDL is linked with a lower risk of heart disease, heart attack, and stroke. Your cholesterol levels help your doctor find out your risk for having a heart attack or stroke. You and your doctor can talk about whether you need to lower your risk and what treatment is best for you. A heart-healthy lifestyle along with medicines can help lower your cholesterol and your risk. The way you choose to lower your risk will depend on how high your risk is for heart attack and stroke. It will also depend on how you feel about taking medicines. Follow-up care is a key part of your treatment and safety. Be sure to make and go to all appointments, and call your doctor if you are having problems. It's also a good idea to know your test results and keep a list of the medicines you take. How can you care for yourself at home? · Eat a variety of foods every day. Good choices include fruits, vegetables, whole grains (like oatmeal), dried beans and peas, nuts and seeds, soy products (like tofu), and fat-free or low-fat dairy products. · Replace butter, margarine, and hydrogenated or partially hydrogenated oils with olive and canola oils. (Canola oil margarine without trans fat is fine.) · Replace red meat with fish, poultry, and soy protein (like tofu). · Limit processed and packaged foods like chips, crackers, and cookies. · Bake, broil, or steam foods. Don't reed them. · Be physically active. Get at least 30 minutes of exercise on most days of the week. Walking is a good choice. You also may want to do other activities, such as running, swimming, cycling, or playing tennis or team sports. · Stay at a healthy weight or lose weight by making the changes in eating and physical activity listed above. Losing just a small amount of weight, even 5 to 10 pounds, can reduce your risk for having a heart attack or stroke. · Do not smoke. When should you call for help? Watch closely for changes in your health, and be sure to contact your doctor if: 
  · You need help making lifestyle changes.  
  · You have questions about your medicine. Where can you learn more? Go to http://dominickMutracxlachelle.info/. Enter B521 in the search box to learn more about \"High Cholesterol: Care Instructions. \" Current as of: April 9, 2019 Content Version: 12.2 © 6188-4024 [employer]. Care instructions adapted under license by [employer] (which disclaims liability or warranty for this information). If you have questions about a medical condition or this instruction, always ask your healthcare professional. [address] any warranty or liability for your use of this information. Vaccine Information Statement Influenza (Flu) Vaccine (Inactivated or Recombinant): What You Need to Know Many Vaccine Information Statements are available in Turkish and other languages. See www.immunize.org/vis Hojas de información sobre vacunas están disponibles en español y en muchos otros idiomas. Visite www.immunize.org/vis 1. Why get vaccinated? Influenza vaccine can prevent influenza (flu). Flu is a contagious disease that spreads around the [place] every year, usually between October and May.  Anyone can get the flu, but it is more dangerous for some people. Infants and young children, people 72years of age and older, pregnant women, and people with certain health conditions or a weakened immune system are at greatest risk of flu complications. Pneumonia, bronchitis, sinus infections and ear infections are examples of flu-related complications. If you have a medical condition, such as heart disease, cancer or diabetes, flu can make it worse. Flu can cause fever and chills, sore throat, muscle aches, fatigue, cough, headache, and runny or stuffy nose. Some people may have vomiting and diarrhea, though this is more common in children than adults. Each year thousands of people in the [facility] die from flu, and many more are hospitalized. Flu vaccine prevents millions of illnesses and flu-related visits to the doctor each year. 2. Influenza vaccines CDC recommends everyone 10months of age and older get vaccinated every flu season. Children 6 months through 6years of age may need 2 doses during a single flu season. Everyone else needs only 1 dose each flu season. It takes about 2 weeks for protection to develop after vaccination. There are many flu viruses, and they are always changing. Each year a new flu vaccine is made to protect against three or four viruses that are likely to cause disease in the upcoming flu season. Even when the vaccine doesnt exactly match these viruses, it may still provide some protection. Influenza vaccine does not cause flu. Influenza vaccine may be given at the same time as other vaccines. 3. Talk with your health care provider Tell your vaccine provider if the person getting the vaccine: 
 Has had an allergic reaction after a previous dose of influenza vaccine, or has any severe, life-threatening allergies.  Has ever had Guillain-Barré Syndrome (also called GBS).  
 
In some cases, your health care provider may decide to postpone influenza vaccination to a future visit. People with minor illnesses, such as a cold, may be vaccinated. People who are moderately or severely ill should usually wait until they recover before getting influenza vaccine. Your health care provider can give you more information. 4. Risks of a reaction  Soreness, redness, and swelling where shot is given, fever, muscle aches, and headache can happen after influenza vaccine.  There may be a very small increased risk of Guillain-Barré Syndrome (GBS) after inactivated influenza vaccine (the flu shot). Carmen Snowden children who get the flu shot along with pneumococcal vaccine (PCV13), and/or DTaP vaccine at the same time might be slightly more likely to have a seizure caused by fever. Tell your health care provider if a child who is getting flu vaccine has ever had a seizure. People sometimes faint after medical procedures, including vaccination. Tell your provider if you feel dizzy or have vision changes or ringing in the ears. As with any medicine, there is a very remote chance of a vaccine causing a severe allergic reaction, other serious injury, or death. 5. What if there is a serious problem? An allergic reaction could occur after the vaccinated person leaves the clinic. If you see signs of a severe allergic reaction (hives, swelling of the face and throat, difficulty breathing, a fast heartbeat, dizziness, or weakness), call 9-1-1 and get the person to the nearest hospital. 
 
For other signs that concern you, call your health care provider. Adverse reactions should be reported to the Vaccine Adverse Event Reporting System (VAERS). Your health care provider will usually file this report, or you can do it yourself. Visit the VAERS website at www.vaers. hhs.gov or call 6-261.788.4288. VAERS is only for reporting reactions, and VAERS staff do not give medical advice.  
 
6. The National Vaccine Injury [facility] [place] 
 
 The [employer] Injury Compensation Program (VICP) is a federal program that was created to compensate people who may have been injured by certain vaccines. Visit the VICP website at www.[facility].gov/vaccinecompensation or call 7-972.282.5231 to learn about the program and about filing a claim. There is a time limit to file a claim for compensation. 7. How can I learn more?  Ask your health care provider.  Call your local or state health department.  Contact the Centers for Disease Control and Prevention (CDC): 
- Call 7-510.199.7450 (1-800-CDC-INFO) or 
- Visit CDCs influenza website at www.cdc.gov/flu Vaccine Information Statement (Interim) Inactivated Influenza Vaccine 8/15/2019 
42 ASHA Rasheed 546YL-86 Department of Health and [employer] Centers for Disease Control and Prevention Office Use Only

## 2019-10-18 NOTE — PROGRESS NOTES
SUBJECTIVE:   62 y.o. female for annual routine checkup. Pt new to us. Has not had most health maintenance tests that would be recommended. Current Outpatient Medications   Medication Sig Dispense Refill    metFORMIN (GLUCOPHAGE) 500 mg tablet TAKE 1 TABLET BY MOUTH TWICE DAILY WITH MEALS 180 Tab 1    amLODIPine (NORVASC) 10 mg tablet Take 1 Tab by mouth every morning. 90 Tab 1    cloNIDine HCl (CATAPRES) 0.1 mg tablet Take 1 Tab by mouth daily. 30 Tab 0    lisinopril (PRINIVIL, ZESTRIL) 10 mg tablet Take 1 Tab by mouth daily. Indications: high blood pressure 90 Tab 1    empagliflozin (JARDIANCE) 10 mg tablet Take 1 Tab by mouth daily. 30 Tab 5    aspirin (ASPIRIN) 325 mg tablet Take 325 mg by mouth daily.  atorvastatin (LIPITOR) 40 mg tablet Take 1 Tab by mouth daily. Indications: excessive fat in the blood 90 Tab 3    Blood-Glucose Meter (CONTOUR NEXT METER) misc Check blood sugar twice daily 1 Each 0    glucose blood VI test strips (CONTOUR NEXT TEST STRIPS) strip Check blood sugar twice daily 200 Strip 3       History reviewed. No pertinent past medical history. Allergies: Patient has no known allergies. No LMP recorded. Patient is premenopausal.      ROS:  Feeling well. No dyspnea or chest pain on exertion. No abdominal pain, change in bowel habits, black or bloody stools. No urinary tract symptoms. GYN ROS: no breast pain or new or enlarging lumps on self exam. No neurological complaints. OBJECTIVE:   The patient appears well, alert, oriented x 3, in no distress.     Visit Vitals  /80   Pulse 91   Temp 97 °F (36.1 °C) (Oral)   Resp 16   Ht 5' 4\" (1.626 m)   Wt 145 lb (65.8 kg)   SpO2 100%   BMI 24.89 kg/m²       General appearance: alert, cooperative, no distress, appears stated age  Head: Normocephalic, without obvious abnormality, atraumatic  Ears: normal TM's and external ear canals AU  Throat: Lips, mucosa, and tongue normal. Teeth and gums normal  Neck: supple, symmetrical, trachea midline, no adenopathy, thyroid: not enlarged, symmetric, no tenderness/mass/nodules, no carotid bruit and no JVD  Back: symmetric, no curvature. ROM normal. No CVA tenderness. Lungs: clear to auscultation bilaterally  Breasts: patient declines to have breast exam.  Heart: regular rate and rhythm, S1, S2 normal, no murmur, click, rub or gallop  Abdomen: soft, non-tender. Bowel sounds normal. No masses,  no organomegaly  Extremities: extremities normal, atraumatic, no cyanosis or edema  Pulses: 2+ and symmetric  Skin: Skin color, texture, turgor normal. No rashes or lesions  Neurological is normal, no focal findings. Lab Results   Component Value Date/Time    WBC 7.9 04/22/2019 08:48 AM    HGB 13.9 04/22/2019 08:48 AM    HCT 44.2 04/22/2019 08:48 AM    PLATELET 900 (H) 87/31/1349 08:48 AM    MCV 87 04/22/2019 08:48 AM         Lab Results   Component Value Date/Time    Sodium 143 04/22/2019 08:48 AM    Potassium 4.6 04/22/2019 08:48 AM    Chloride 98 04/22/2019 08:48 AM    CO2 24 04/22/2019 08:48 AM    Anion gap 21.0 04/22/2019 08:48 AM    Glucose 85 04/22/2019 08:48 AM    BUN 14 04/22/2019 08:48 AM    Creatinine 0.6 04/22/2019 08:48 AM    Calcium 9.8 04/22/2019 08:48 AM         Lab Results   Component Value Date/Time    ALT (SGPT) 26 04/22/2019 08:48 AM    AST (SGOT) 27 04/22/2019 08:48 AM    Alk.  phosphatase 73 04/22/2019 08:48 AM    Bilirubin, total 0.4 04/22/2019 08:48 AM         Lab Results   Component Value Date/Time    Cholesterol, total 228 (H) 11/29/2018 09:25 AM    HDL Cholesterol 59 11/29/2018 09:25 AM    LDL, calculated 131 (H) 11/29/2018 09:25 AM    VLDL, calculated 38 (H) 11/29/2018 09:25 AM    Triglyceride 191 (H) 11/29/2018 09:25 AM         Lab Results   Component Value Date/Time    TSH 2.09 11/29/2018 09:25 AM    T4, Free 1.3 11/29/2018 09:25 AM         No results found for: Patricio Calvert, XQVID3, XQVID, VD3RIA          ASSESSMENT:   Diagnoses and all orders for this visit: 1. Annual physical exam  -     CBC WITH AUTOMATED DIFF; Future  -     HEPATIC FUNCTION PANEL; Future  -     LIPID PANEL; Future  -     METABOLIC PANEL, BASIC; Future  -     TSH 3RD GENERATION; Future  -     T4, FREE; Future  -     URINALYSIS W/ RFLX MICROSCOPIC; Future  -     VITAMIN D, 25 HYDROXY; Future    2. Controlled type 2 diabetes mellitus with complication, without long-term current use of insulin (HCC)  -     metFORMIN (GLUCOPHAGE) 500 mg tablet; TAKE 1 TABLET BY MOUTH TWICE DAILY WITH MEALS  -     REFERRAL TO OPHTHALMOLOGY  -     HEMOGLOBIN A1C W/O EAG; Future  -     MICROALBUMIN, UR, RAND W/ MICROALB/CREAT RATIO; Future    3. Colon cancer screening  -     COLOGUARD TEST (FECAL DNA COLORECTAL CANCER SCREENING)    4. Essential hypertension  -     amLODIPine (NORVASC) 10 mg tablet; Take 1 Tab by mouth every morning.  -     lisinopril (PRINIVIL, ZESTRIL) 10 mg tablet; Take 1 Tab by mouth daily. Indications: high blood pressure    5. Dyslipidemia    6. Postmenopausal  -     DEXA BONE DENSITY STUDY AXIAL; Future    7. Breast cancer screening  -     [facility] MAMMO BI SCREENING INCL CAD; Future    8. Need for hepatitis C screening test  -     HEPATITIS C AB; Future    9. Encounter for immunization  -     INFLUENZA VIRUS VAC QUAD,SPLIT,PRESV FREE SYRINGE IM  -     [place] IMMUNIZ ADMIN,1 SINGLE/COMB VAC/TOXOID  -     TETANUS, DIPHTHERIA TOXOIDS AND ACELLULAR PERTUSSIS VACCINE (TDAP), IN INDIVIDS. >=7, IM    Other orders  -     cloNIDine HCl (CATAPRES) 0.1 mg tablet; Take 1 Tab by mouth daily. PLAN:   Mammogram: will refer  DEXA: will refer  Colonoscopy: will order cologuard. Pap: with her Gyn. Will await lab results. Routine f/u in 4 months. The plan was discussed with the patient. The patient verbalized understanding and is in agreement with the plan. All medication potential side effects were discussed with the patient.

## 2019-10-18 NOTE — PROGRESS NOTES
Blank Lyman is a 62 y.o. female who presents today for the Influenza vaccine. Patient was afebrile and completed the immunization questionnaire and consent before administration. No adverse reactions noted while in office. TDAP administered in the right deltoid  10/18/2019 by Emmett Celaya LPN with consent. Patient tolerated procedure well. With no bleeding observed at injection site. No reactions noted.

## 2019-10-18 NOTE — PROGRESS NOTES
Adrianna Caldera is a 62 y.o. female (: 1965) presenting to address:    Chief Complaint   Patient presents with    Establish Care    Hypertension       Vitals:    10/18/19 0828   BP: 131/80   Pulse: 91   Resp: 16   Temp: 97 °F (36.1 °C)   TempSrc: Oral   SpO2: 100%   Weight: 145 lb (65.8 kg)   Height: 5' 4\" (1.626 m)   PainSc:   0 - No pain       Hearing/Vision:   No exam data present    Learning Assessment:     Learning Assessment 2018   PRIMARY LEARNER Patient   HIGHEST LEVEL OF EDUCATION - PRIMARY LEARNER  4 YEARS OF COLLEGE   BARRIERS PRIMARY LEARNER NONE   CO-LEARNER CAREGIVER No   PRIMARY LANGUAGE ENGLISH   LEARNER PREFERENCE PRIMARY DEMONSTRATION   ANSWERED BY patient   RELATIONSHIP SELF     Depression Screening:     3 most recent PHQ Screens 10/18/2019   Little interest or pleasure in doing things Not at all   Feeling down, depressed, irritable, or hopeless Not at all   Total Score PHQ 2 0     Fall Risk Assessment:     Fall Risk Assessment, last 12 mths 10/18/2019   Able to walk? Yes   Fall in past 12 months? No     Abuse Screening:     Abuse Screening Questionnaire 10/18/2019   Do you ever feel afraid of your partner? N   Are you in a relationship with someone who physically or mentally threatens you? N   Is it safe for you to go home? Y     Coordination of Care Questionaire:   1. Have you been to the ER, urgent care clinic since your last visit? Hospitalized since your last visit? NO    2. Have you seen or consulted any other health care providers outside of the [address] [place] [address] since your last visit? Include any pap smears or colon screening. NO    Advanced Directive:   1. Do you have an Advanced Directive? NO    2. Would you like information on Advanced Directives?  NO

## 2019-10-19 LAB
25(OH)D3 SERPL-MCNC: 34.4 NG/ML (ref 32–100)
A-G RATIO,AGRAT: 1.6 RATIO (ref 1.1–2.6)
ABSOLUTE LYMPHOCYTE COUNT, 10803: 1.4 K/UL (ref 1–4.8)
ALBUMIN SERPL-MCNC: 4.2 G/DL (ref 3.5–5)
ALP SERPL-CCNC: 68 U/L (ref 25–115)
ALT SERPL-CCNC: 19 U/L (ref 5–40)
ANION GAP SERPL CALC-SCNC: 10 MMOL/L
AST SERPL W P-5'-P-CCNC: 24 U/L (ref 10–37)
AVG GLU, 10930: 109 MG/DL (ref 91–123)
BASOPHILS # BLD: 0.1 K/UL (ref 0–0.2)
BASOPHILS NFR BLD: 1 % (ref 0–2)
BILIRUB SERPL-MCNC: 0.4 MG/DL (ref 0.2–1.2)
BILIRUB UR QL: NEGATIVE
BILIRUBIN, DIRECT,CBIL: <0.2 MG/DL (ref 0–0.3)
BUN SERPL-MCNC: 23 MG/DL (ref 6–22)
CALCIUM SERPL-MCNC: 9.2 MG/DL (ref 8.4–10.5)
CHLORIDE SERPL-SCNC: 101 MMOL/L (ref 98–110)
CHOLEST SERPL-MCNC: 147 MG/DL (ref 110–200)
CO2 SERPL-SCNC: 29 MMOL/L (ref 20–32)
CREAT SERPL-MCNC: 0.5 MG/DL (ref 0.5–1.2)
CREATININE, URINE: 58 MG/DL
EOSINOPHIL # BLD: 0.2 K/UL (ref 0–0.5)
EOSINOPHIL NFR BLD: 3 % (ref 0–6)
ERYTHROCYTE [DISTWIDTH] IN BLOOD BY AUTOMATED COUNT: 15.1 % (ref 10–15.5)
GFRAA, 66117: >60
GFRNA, 66118: >60
GLOBULIN,GLOB: 2.7 G/DL (ref 2–4)
GLUCOSE SERPL-MCNC: 88 MG/DL (ref 70–99)
GLUCOSE UR QL: ABNORMAL MG/DL
GRANULOCYTES,GRANS: 70 % (ref 40–75)
HBA1C MFR BLD HPLC: 5.4 % (ref 4.8–5.9)
HCT VFR BLD AUTO: 41.8 % (ref 35.1–48)
HCV AB SER IA-ACNC: NORMAL
HDLC SERPL-MCNC: 1.9 MG/DL (ref 0–5)
HDLC SERPL-MCNC: 78 MG/DL (ref 40–59)
HGB BLD-MCNC: 13.2 G/DL (ref 11.7–16)
HGB UR QL STRIP: NEGATIVE
KETONES UR QL STRIP.AUTO: NEGATIVE MG/DL
LDLC SERPL CALC-MCNC: 58 MG/DL (ref 50–99)
LEUKOCYTE ESTERASE: NEGATIVE
LYMPHOCYTES, LYMLT: 20 % (ref 20–45)
MCH RBC QN AUTO: 27 PG (ref 26–34)
MCHC RBC AUTO-ENTMCNC: 32 G/DL (ref 31–36)
MCV RBC AUTO: 87 FL (ref 80–95)
MICROALB/CREAT RATIO, 140286: NORMAL MCG/MG OF CREATININE (ref 0–30)
MICROALBUMIN,URINE RANDOM 140054: <12 MCG/ML (ref 0.1–17)
MONOCYTES # BLD: 0.4 K/UL (ref 0.1–1)
MONOCYTES NFR BLD: 6 % (ref 3–12)
NEUTROPHILS # BLD AUTO: 4.8 K/UL (ref 1.8–7.7)
NITRITE UR QL STRIP.AUTO: NEGATIVE
PH UR STRIP: 5 PH (ref 5–8)
PLATELET # BLD AUTO: 379 K/UL (ref 140–440)
PMV BLD AUTO: 9.8 FL (ref 9–13)
POTASSIUM SERPL-SCNC: 4.6 MMOL/L (ref 3.5–5.5)
PROT SERPL-MCNC: 6.9 G/DL (ref 6.4–8.3)
PROT UR QL STRIP: NEGATIVE MG/DL
RBC # BLD AUTO: 4.81 M/UL (ref 3.8–5.2)
RBC #/AREA URNS HPF: ABNORMAL /HPF
SODIUM SERPL-SCNC: 140 MMOL/L (ref 133–145)
SP GR UR: 1.03 (ref 1–1.03)
T4 FREE SERPL-MCNC: 1.1 NG/DL (ref 0.9–1.8)
TRIGL SERPL-MCNC: 58 MG/DL (ref 40–149)
TSH SERPL DL<=0.005 MIU/L-ACNC: 1.65 MCU/ML (ref 0.27–4.2)
UROBILINOGEN UR STRIP-MCNC: <2 MG/DL
VLDLC SERPL CALC-MCNC: 12 MG/DL (ref 8–30)
WBC # BLD AUTO: 6.8 K/UL (ref 4–11)
WBC URNS QL MICRO: ABNORMAL /HPF (ref 0–2)

## 2019-12-10 ENCOUNTER — HOSPITAL ENCOUNTER (OUTPATIENT)
Dept: GENERAL RADIOLOGY | Age: 54
Discharge: HOME | End: 2019-12-10
Attending: INTERNAL MEDICINE
Payer: COMMERCIAL

## 2019-12-10 ENCOUNTER — HOSPITAL ENCOUNTER (OUTPATIENT)
Dept: MAMMOGRAPHY | Age: 54
Discharge: HOME | End: 2019-12-10
Attending: INTERNAL MEDICINE
Payer: COMMERCIAL

## 2019-12-10 DIAGNOSIS — Z12.39 BREAST CANCER SCREENING: ICD-10-CM

## 2019-12-10 DIAGNOSIS — Z78.0 POSTMENOPAUSAL: ICD-10-CM

## 2019-12-10 PROCEDURE — 77080 DXA BONE DENSITY AXIAL: CPT

## 2019-12-10 PROCEDURE — 77063 BREAST TOMOSYNTHESIS BI: CPT

## 2019-12-30 ENCOUNTER — HOSPITAL ENCOUNTER (OUTPATIENT)
Dept: MAMMOGRAPHY | Age: 54
Discharge: HOME | End: 2019-12-30
Attending: INTERNAL MEDICINE
Payer: COMMERCIAL

## 2019-12-30 ENCOUNTER — HOSPITAL ENCOUNTER (OUTPATIENT)
Dept: ULTRASOUND IMAGING | Age: 54
Discharge: HOME | End: 2019-12-30
Attending: INTERNAL MEDICINE
Payer: COMMERCIAL

## 2019-12-30 DIAGNOSIS — R92.2 INCONCLUSIVE MAMMOGRAM: ICD-10-CM

## 2019-12-30 DIAGNOSIS — R92.8 ABNORMALITY OF LEFT BREAST ON SCREENING MAMMOGRAM: ICD-10-CM

## 2019-12-30 PROCEDURE — 76642 ULTRASOUND BREAST LIMITED: CPT

## 2019-12-30 PROCEDURE — 77061 BREAST TOMOSYNTHESIS UNI: CPT

## 2019-12-30 NOTE — PROGRESS NOTES
Please confirm with pt that she knows that she needs to repeat the mammogram in 6 months, possibly and US as well. She should call the hospital to set this up.

## 2020-02-03 ENCOUNTER — OFFICE VISIT (OUTPATIENT)
Dept: FAMILY MEDICINE CLINIC | Age: 55
End: 2020-02-03

## 2020-02-03 VITALS
SYSTOLIC BLOOD PRESSURE: 162 MMHG | OXYGEN SATURATION: 100 % | WEIGHT: 159 LBS | HEART RATE: 71 BPM | TEMPERATURE: 97.9 F | HEIGHT: 64 IN | RESPIRATION RATE: 16 BRPM | BODY MASS INDEX: 27.14 KG/M2 | DIASTOLIC BLOOD PRESSURE: 81 MMHG

## 2020-02-03 DIAGNOSIS — I10 UNCONTROLLED HYPERTENSION: ICD-10-CM

## 2020-02-03 DIAGNOSIS — E78.5 DYSLIPIDEMIA: ICD-10-CM

## 2020-02-03 DIAGNOSIS — E11.8 CONTROLLED TYPE 2 DIABETES MELLITUS WITH COMPLICATION, WITHOUT LONG-TERM CURRENT USE OF INSULIN (HCC): Primary | ICD-10-CM

## 2020-02-03 DIAGNOSIS — Z23 ENCOUNTER FOR IMMUNIZATION: ICD-10-CM

## 2020-02-03 NOTE — PROGRESS NOTES
Lindsay Elizabeth is a 62 y.o. female (: 1965) presenting to address:    Chief Complaint   Patient presents with    Diabetes     jardiance needs prior auth     Hypertension       Vitals:    20 0934   BP: 162/81   Pulse: 71   Resp: 16   Temp: 97.9 °F (36.6 °C)   TempSrc: Oral   SpO2: 100%   Weight: 159 lb (72.1 kg)   Height: 5' 4\" (1.626 m)   PainSc:   0 - No pain       Hearing/Vision:   No exam data present    Learning Assessment:     Learning Assessment 2018   PRIMARY LEARNER Patient   HIGHEST LEVEL OF EDUCATION - PRIMARY LEARNER  4 YEARS OF COLLEGE   BARRIERS PRIMARY LEARNER NONE   CO-LEARNER CAREGIVER No   PRIMARY LANGUAGE ENGLISH   LEARNER PREFERENCE PRIMARY DEMONSTRATION   ANSWERED BY patient   RELATIONSHIP SELF     Depression Screening:     3 most recent PHQ Screens 2/3/2020   Little interest or pleasure in doing things Not at all   Feeling down, depressed, irritable, or hopeless Not at all   Total Score PHQ 2 0     Fall Risk Assessment:     Fall Risk Assessment, last 12 mths 10/18/2019   Able to walk? Yes   Fall in past 12 months? No     Abuse Screening:     Abuse Screening Questionnaire 10/18/2019   Do you ever feel afraid of your partner? N   Are you in a relationship with someone who physically or mentally threatens you? N   Is it safe for you to go home? Y     Coordination of Care Questionaire:   1. Have you been to the ER, urgent care clinic since your last visit? Hospitalized since your last visit? NO    2. Have you seen or consulted any other health care providers outside of the [address] [place] [address] since your last visit? Include any pap smears or colon screening. NO    Advanced Directive:   1. Do you have an Advanced Directive? NO    2. Would you like information on Advanced Directives?  NO

## 2020-02-03 NOTE — PATIENT INSTRUCTIONS

## 2020-02-03 NOTE — PROGRESS NOTES
Pneumovax 23 administered in the right deltoid  2/3/2020 by Rosalina Kate LPN with consent. Patient tolerated procedure well. With no bleeding observed at injection site. No reactions noted.

## 2020-02-03 NOTE — PROGRESS NOTES
Assessment/Plan:    *Diagnoses and all orders for this visit:    1. Controlled type 2 diabetes mellitus with complication, without long-term current use of insulin ([address]    2. Dyslipidemia    3. Uncontrolled hypertension    4. Encounter for immunization  -     PNEUMOCOCCAL POLYSACCHARIDE VACCINE, 23-VALENT, ADULT OR IMMUNOSUPPRESSED PT DOSE,  -     MI IMMUNIZ ADMIN,1 SINGLE/COMB VAC/TOXOID          F/u 4 months. Just A1c. The plan was discussed with the patient. The patient verbalized understanding and is in agreement with the plan. All medication potential side effects were discussed with the patient.    -------------------------------------------------------------------------------------------------------------------        Laurataylor Carson is a 62 y.o. female and presents with Diabetes (jardiance needs prior auth ) and Hypertension         Subjective:  Pt here for f/u. DM:  Has been quite successful in keeping her sugars controlled on Metformin and Jardiance. Has been on this combo for some time. Had a change in insurance 1/1/20 and they are not covering [address] [place] [address]. She was not controlled on Metformin alone. HTN:    HLD: last labs well controlled on Lipitor. Review of Systems - ENT ROS: negative  Respiratory ROS: no cough, shortness of breath, or wheezing  Cardiovascular ROS: no chest pain or dyspnea on exertion  Gastrointestinal ROS: no abdominal pain, change in bowel habits, or black or bloody stools         The problem list was updated as a part of today's visit. Patient Active Problem List   Diagnosis Code    Severe obesity ([address] E66.01    Essential hypertension I10    Controlled type 2 diabetes mellitus with complication, without long-term current use of insulin (HCC) E11.8    Dyslipidemia E78.5       The PSH, FH were reviewed. SH:  Social History     Tobacco Use    Smoking status: Never Smoker    Smokeless tobacco: Never Used   Substance Use Topics    Alcohol use:  No Frequency: Never    Drug use: No       Medications/Allergies:  Current Outpatient Medications on File Prior to Visit   Medication Sig Dispense Refill    JARDIANCE 10 mg tablet TAKE 1 TABLET BY MOUTH DAILY 90 Tab 1    metFORMIN (GLUCOPHAGE) 500 mg tablet TAKE 1 TABLET BY MOUTH TWICE DAILY WITH MEALS 180 Tab 1    amLODIPine (NORVASC) 10 mg tablet Take 1 Tab by mouth every morning. 90 Tab 1    lisinopril (PRINIVIL, ZESTRIL) 10 mg tablet Take 1 Tab by mouth daily. Indications: high blood pressure 90 Tab 1    aspirin (ASPIRIN) 325 mg tablet Take 325 mg by mouth daily.  atorvastatin (LIPITOR) 40 mg tablet Take 1 Tab by mouth daily. Indications: excessive fat in the blood 90 Tab 3    Blood-Glucose Meter (CONTOUR NEXT METER) misc Check blood sugar twice daily 1 Each 0    glucose blood VI test strips (CONTOUR NEXT TEST STRIPS) strip Check blood sugar twice daily 200 Strip 3    cloNIDine HCl (CATAPRES) 0.1 mg tablet Take 1 Tab by mouth daily. 30 Tab 0     No current facility-administered medications on file prior to visit. No Known Allergies      Health Maintenance:   Health Maintenance   Topic Date Due    Pneumococcal 0-64 years (1 of 1 - PPSV23) 07/06/1969    FOOT EXAM Q1  07/06/1973    PAP AKA CERVICAL CYTOLOGY  07/06/1984    Shingrix Vaccine Age 50> (1 of 2) 07/06/2013    HEMOGLOBIN A1C Q6M  04/18/2020    MICROALBUMIN Q1  10/18/2020    LIPID PANEL Q1  10/18/2020    EYE EXAM RETINAL OR DILATED  11/01/2021    BREAST CANCER SCRN MAMMOGRAM  12/30/2021    Cologuard Q 3 Years  11/01/2022    Bone Densitometry (Dexa) Screening  07/06/2028    DTaP/Tdap/Td series (2 - Td) 10/18/2029    Hepatitis C Screening  Completed    Influenza Age 5 to Adult  Completed       Objective:  Visit Vitals  /81   Pulse 71   Temp 97.9 °F (36.6 °C) (Oral)   Resp 16   Ht 5' 4\" (1.626 m)   Wt 159 lb (72.1 kg)   SpO2 100%   BMI 27.29 kg/m²          Nurses notes and VS reviewed.       Physical Examination: General appearance - alert, well appearing, and in no distress  Chest - clear to auscultation, no wheezes, rales or rhonchi, symmetric air entry  Heart - normal rate, regular rhythm, normal S1, S2, no murmurs, rubs, clicks or gallops          Labwork and Ancillary Studies:    CBC w/Diff  Lab Results   Component Value Date/Time    WBC 6.8 10/18/2019 09:25 AM    HGB 13.2 10/18/2019 09:25 AM    PLATELET 509 64/84/4258 09:25 AM         Basic Metabolic Profile  Lab Results   Component Value Date/Time    Sodium 140 10/18/2019 09:25 AM    Potassium 4.6 10/18/2019 09:25 AM    Chloride 101 10/18/2019 09:25 AM    CO2 29 10/18/2019 09:25 AM    Anion gap 10.0 10/18/2019 09:25 AM    Glucose 88 10/18/2019 09:25 AM    BUN 23 (H) 10/18/2019 09:25 AM    Creatinine 0.5 10/18/2019 09:25 AM    Calcium 9.2 10/18/2019 09:25 AM         LFT  Lab Results   Component Value Date/Time    ALT (SGPT) 19 10/18/2019 09:25 AM    AST (SGOT) 24 10/18/2019 09:25 AM    Alk.  phosphatase 68 10/18/2019 09:25 AM    Bilirubin, direct <0.2 10/18/2019 09:25 AM    Bilirubin, total 0.4 10/18/2019 09:25 AM         Cholesterol  Lab Results   Component Value Date/Time    Cholesterol, total 147 10/18/2019 09:25 AM    HDL Cholesterol 78 (H) 10/18/2019 09:25 AM    LDL, calculated 58 10/18/2019 09:25 AM    Triglyceride 58 10/18/2019 09:25 AM

## 2020-02-13 ENCOUNTER — TELEPHONE (OUTPATIENT)
Dept: FAMILY MEDICINE CLINIC | Age: 55
End: 2020-02-13

## 2020-02-13 NOTE — TELEPHONE ENCOUNTER
----- Message from Ronnell Peoples sent at 2/13/2020 10:52 AM EST -----  Regarding: Prescription Question  Contact: [phone]  Checking on status of Jardiance approval by new insurance. Or approval of substitution. Thanks.

## 2020-02-20 NOTE — TELEPHONE ENCOUNTER
Received notification from the patients insurance (that we have on file), that the authorization request has been cancelled because \"the member does not have pharmacy benefits with [facility]. \" Called the patient to ask if there is a secondary or different insurance. Left a voicemail for the patient to call back and let me know.

## 2020-02-20 NOTE — TELEPHONE ENCOUNTER
Spoke to the patient, and received the information for her pharmacy benefits. Bin #: [id number]  PCN #: [id number]  Group #: [id number] [id number]  Member ID: [id number]    Put in a new request via cover my meds, and it is currently pending. Patient aware.

## 2020-02-21 NOTE — TELEPHONE ENCOUNTER
Prior authorization for Jardiance approved by the patients insurance, and is valid through 02/20/2021. Patient notified and verbalized understanding.

## 2020-02-28 ENCOUNTER — OFFICE VISIT (OUTPATIENT)
Dept: CARDIOLOGY CLINIC | Age: 55
End: 2020-02-28

## 2020-02-28 VITALS
BODY MASS INDEX: 26.95 KG/M2 | DIASTOLIC BLOOD PRESSURE: 74 MMHG | HEART RATE: 72 BPM | WEIGHT: 157 LBS | OXYGEN SATURATION: 98 % | SYSTOLIC BLOOD PRESSURE: 123 MMHG

## 2020-02-28 DIAGNOSIS — I10 ESSENTIAL HYPERTENSION: Primary | ICD-10-CM

## 2020-02-28 NOTE — PROGRESS NOTES
1. Have you been to the ER, urgent care clinic since your last visit? Hospitalized since your last visit? No    2. Have you seen or consulted any other health care providers outside of the [address] [place] [address] since your last visit? Include any pap smears or colon screening.  No

## 2020-03-02 NOTE — PROGRESS NOTES
Subjective:      Layton is in the office today for cardiac evaluation. She is a 75-year-old woman that was  diagnosed with hypertension (November 2018). She was also diagnosed with diabetes at the same time. When she was initially seen in the office on 2/20/2019 she  had no specific complaints. She  had no chest pain or shortness of breath. She walked a mile to 1-1/2 miles every day in her neighborhood without difficulty. She had no palpitations, near-syncope or syncope. She  had no peripheral swelling. Ethelene Gauss She was started on amlodipine and clonidine for hypertension. .    An echocardiogram was ordered at the initial visit. She had the echocardiogram on 1/12/19. Her ventricular function was normal.  There was no significant valvular pathology. She was advised to pursue weight reduction and increase physical activity which she has done. She has managed to lose 33 pounds since her last appointment. She walks 2 miles per day. She checks her blood pressure at home and it is usually well controlled. She had no chest pain or shortness of breath. She does report she had a MRI that demonstrated a 60% carotid stenosis. .     Patient's cardiac risk factors are dyslipidemia, diabetes mellitus, hypertension. Patient Active Problem List    Diagnosis Date Noted    Essential hypertension 01/07/2019    Controlled type 2 diabetes mellitus with complication, without long-term current use of insulin ([facility] 01/07/2019    Dyslipidemia 01/07/2019    Severe obesity ([facility] 11/29/2018     Current Outpatient Medications   Medication Sig Dispense Refill    JARDIANCE 10 mg tablet TAKE 1 TABLET BY MOUTH DAILY 90 Tab 1    metFORMIN (GLUCOPHAGE) 500 mg tablet TAKE 1 TABLET BY MOUTH TWICE DAILY WITH MEALS 180 Tab 1    amLODIPine (NORVASC) 10 mg tablet Take 1 Tab by mouth every morning. 90 Tab 1    lisinopril (PRINIVIL, ZESTRIL) 10 mg tablet Take 1 Tab by mouth daily.  Indications: high blood pressure 90 Tab 1    aspirin (ASPIRIN) 325 mg tablet Take 325 mg by mouth daily.  atorvastatin (LIPITOR) 40 mg tablet Take 1 Tab by mouth daily. Indications: excessive fat in the blood 90 Tab 3    Blood-Glucose Meter (CONTOUR NEXT METER) misc Check blood sugar twice daily 1 Each 0    glucose blood VI test strips (CONTOUR NEXT TEST STRIPS) strip Check blood sugar twice daily 200 Strip 3     No Known Allergies  Past Medical History:   Diagnosis Date    Menopause      History reviewed. No pertinent surgical history. Family History   Problem Relation Age of Onset    No Known Problems Mother     No Known Problems Father      Social History     Tobacco Use   Smoking Status Never Smoker   Smokeless Tobacco Never Used          Review of Systems, additional:  Constitutional: negative  Eyes: negative  Respiratory: negative  Cardiovascular: negative  Gastrointestinal: negative  Musculoskeletal:negative  Neurological: negative  Behvioral/Psych: negative  Endocrine: negative  ENT: negative    Objective:     Visit Vitals  /74   Pulse 72   Wt 71.2 kg (157 lb)   SpO2 98%   BMI 26.95 kg/m²     General:  alert, cooperative, no distress   Chest Wall: inspection normal - no chest wall deformities or tenderness, respiratory effort normal   Lung: clear to auscultation bilaterally   Heart:  normal rate and regular rhythm, S1 and S2 normal, no murmurs noted, no gallops noted, no JVD   Abdomen: soft, non-tender. Bowel sounds normal. No masses,  no organomegaly   Extremities: extremities normal, atraumatic, no cyanosis or edema Skin: no rashes   Neuro: alert, oriented, normal speech, no focal findings or movement disorder noted     EK19. Sinus rhythm. Nondiagnostic ST and T wave abnormalities, lateral    Assessment/Plan:       ICD-10-CM ICD-9-CM    1. Hypertension, essential.  Blood pressure controlled in the office today. Echocardiogram done 19.   Normal left ventricular dimensions and function with no significant valvular pathology. No LVH noted. Blood pressure checks at home with acceptable readings. Follow-up with her PCP. Return here as needed I10 401.9 AMB POC EKG ROUTINE W/ 12 LEADS, INTER & REP      ECHO ADULT COMPLETE   2. Dyslipidemia, last LDL 58 on 10/18/2019. E78.5 272.4    3. Severe obesity ([facility] [address] patient lost 33 pounds by tracking her carbohydrate intake and calories. She is walking 2 miles per day. E66.01 278.01    4. Controlled type 2 diabetes mellitus with complication, without long-term current use of insulin (HCC) E11.8 250.90    5. Essential hypertension, stable in the office today.   I10 401.9

## 2020-05-31 DIAGNOSIS — E78.5 DYSLIPIDEMIA: ICD-10-CM

## 2020-05-31 DIAGNOSIS — E11.8 CONTROLLED TYPE 2 DIABETES MELLITUS WITH COMPLICATION, WITHOUT LONG-TERM CURRENT USE OF INSULIN (HCC): Primary | ICD-10-CM

## 2020-06-09 LAB
A-G RATIO,AGRAT: 1.9 RATIO (ref 1.1–2.6)
ALBUMIN SERPL-MCNC: 4.5 G/DL (ref 3.5–5)
ALP SERPL-CCNC: 63 U/L (ref 25–115)
ALT SERPL-CCNC: 14 U/L (ref 5–40)
ANION GAP SERPL CALC-SCNC: 14.7 MMOL/L
AST SERPL W P-5'-P-CCNC: 25 U/L (ref 10–37)
AVG GLU, 10930: 112 MG/DL (ref 91–123)
BILIRUB SERPL-MCNC: 0.5 MG/DL (ref 0.2–1.2)
BILIRUBIN, DIRECT,CBIL: <0.2 MG/DL (ref 0–0.3)
BUN SERPL-MCNC: 12 MG/DL (ref 6–22)
CALCIUM SERPL-MCNC: 9.7 MG/DL (ref 8.4–10.5)
CHLORIDE SERPL-SCNC: 100 MMOL/L (ref 98–110)
CHOLEST SERPL-MCNC: 148 MG/DL (ref 110–200)
CO2 SERPL-SCNC: 24 MMOL/L (ref 20–32)
CREAT SERPL-MCNC: 0.6 MG/DL (ref 0.5–1.2)
GFRAA, 66117: >60
GFRNA, 66118: >60
GLOBULIN,GLOB: 2.4 G/DL (ref 2–4)
GLUCOSE SERPL-MCNC: 161 MG/DL (ref 70–99)
HBA1C MFR BLD HPLC: 5.5 % (ref 4.8–5.6)
HDLC SERPL-MCNC: 1.9 MG/DL (ref 0–5)
HDLC SERPL-MCNC: 78 MG/DL
LDL/HDL RATIO,LDHD: 0.7
LDLC SERPL CALC-MCNC: 56 MG/DL (ref 50–99)
NON-HDL CHOLESTEROL, 011976: 70 MG/DL
POTASSIUM SERPL-SCNC: 4.2 MMOL/L (ref 3.5–5.5)
PROT SERPL-MCNC: 6.9 G/DL (ref 6.4–8.3)
SODIUM SERPL-SCNC: 139 MMOL/L (ref 133–145)
TRIGL SERPL-MCNC: 73 MG/DL (ref 40–149)
VLDLC SERPL CALC-MCNC: 15 MG/DL (ref 8–30)

## 2020-06-10 ENCOUNTER — VIRTUAL VISIT (OUTPATIENT)
Dept: FAMILY MEDICINE CLINIC | Age: 55
End: 2020-06-10

## 2020-06-10 DIAGNOSIS — I10 ESSENTIAL HYPERTENSION: ICD-10-CM

## 2020-06-10 DIAGNOSIS — E11.8 CONTROLLED TYPE 2 DIABETES MELLITUS WITH COMPLICATION, WITHOUT LONG-TERM CURRENT USE OF INSULIN (HCC): Primary | ICD-10-CM

## 2020-06-10 DIAGNOSIS — E78.5 DYSLIPIDEMIA: ICD-10-CM

## 2020-06-10 DIAGNOSIS — N64.89 BREAST ASYMMETRY: ICD-10-CM

## 2020-06-10 DIAGNOSIS — R92.8 ABNORMAL MAMMOGRAM: ICD-10-CM

## 2020-06-10 NOTE — PROGRESS NOTES
Dia Islas is a 62 y.o. female who was seen by synchronous (real-time) audio-video technology on 6/10/2020. Consent: Dia Islas, who was seen by synchronous (real-time) audio-video technology, and/or her healthcare decision maker, is aware that this patient-initiated, Telehealth encounter on 6/10/2020 is a billable service, with coverage as determined by her insurance carrier. She is aware that she may receive a bill and has provided verbal consent to proceed: Yes. Assessment & Plan:   Diagnoses and all orders for this visit:    1. Controlled type 2 diabetes mellitus with complication, without long-term current use of insulin ([facility]    2. Dyslipidemia    3. Essential hypertension    4. Abnormal mammogram  -     [facility] MAMMO LT DX INCL CAD; Future    5. Breast asymmetry  -     US BREAST LT LIMITED=<3 QUAD; Future        Will f/u in 3-4 months. Will stop Jardiance since her A1c has been so low. Will see how her A1c responds to this on next visit. 712  Subjective:   Dia Islas is a 62 y.o. female who was seen for Diabetes    Pt was seen on a virtual visit today. DM:  Well controlled, A1c still very low at 5.5%. Probably lower than we need it to be. HTN:  Stable. HLD:  At goal.    She is also due for her 6 mon LT breast images to be repeated. Prior to Admission medications    Medication Sig Start Date End Date Taking? Authorizing Provider   Jardiance 10 mg tablet TAKE 1 TABLET BY MOUTH DAILY 5/19/20 6/10/20  Ilene Vazquez MD   amLODIPine (NORVASC) 10 mg tablet TAKE 1 TABLET BY MOUTH EVERY MORNING 5/13/20   Ilene Vazquez MD   metFORMIN (GLUCOPHAGE) 500 mg tablet TAKE 1 TABLET BY MOUTH TWICE DAILY WITH MEALS 5/13/20   Ilene Vazquez MD   lisinopriL (PRINIVIL, ZESTRIL) 10 mg tablet Take 1 Tab by mouth daily. Indications: high blood pressure 4/15/20   Ilene Vazquez MD   atorvastatin (LIPITOR) 40 mg tablet Take 1 Tab by mouth daily.  Indications: excessive fat in the blood 4/14/20   Celsa Cabello MD   aspirin (ASPIRIN) 325 mg tablet Take 325 mg by mouth daily. Provider, Historical   Blood-Glucose Meter (CONTOUR NEXT METER) misc Check blood sugar twice daily 12/12/18   Jennie Bazan MD   glucose blood VI test strips (CONTOUR NEXT TEST STRIPS) strip Check blood sugar twice daily 12/12/18   Jennie Bazan MD     No Known Allergies    Patient Active Problem List   Diagnosis Code    Severe obesity ([facility] E66.01    Essential hypertension I10    Controlled type 2 diabetes mellitus with complication, without long-term current use of insulin ([facility]) E11.8    Dyslipidemia E78.5     Current Outpatient Medications   Medication Sig Dispense Refill    amLODIPine (NORVASC) 10 mg tablet TAKE 1 TABLET BY MOUTH EVERY MORNING 90 Tab 0    metFORMIN (GLUCOPHAGE) 500 mg tablet TAKE 1 TABLET BY MOUTH TWICE DAILY WITH MEALS 180 Tab 0    lisinopriL (PRINIVIL, ZESTRIL) 10 mg tablet Take 1 Tab by mouth daily. Indications: high blood pressure 90 Tab 1    atorvastatin (LIPITOR) 40 mg tablet Take 1 Tab by mouth daily. Indications: excessive fat in the blood 90 Tab 1    aspirin (ASPIRIN) 325 mg tablet Take 325 mg by mouth daily.  Blood-Glucose Meter (CONTOUR NEXT METER) misc Check blood sugar twice daily 1 Each 0    glucose blood VI test strips (CONTOUR NEXT TEST STRIPS) strip Check blood sugar twice daily 200 Strip 3     No Known Allergies  Past Medical History:   Diagnosis Date    Menopause      No past surgical history on file. Review of Systems   All other systems reviewed and are negative. Objective: There were no vitals taken for this visit.    General: alert, cooperative, no distress   Mental  status: normal mood, behavior, speech, dress, motor activity, and thought processes, able to follow commands   HENT: NCAT   Neck: no visualized mass   Resp: no respiratory distress   Neuro: no gross deficits   Skin: no discoloration or lesions of concern on visible areas Psychiatric: normal affect, consistent with stated mood, no evidence of hallucinations     Additional exam findings: We discussed the expected course, resolution and complications of the diagnosis(es) in detail. Medication risks, benefits, costs, interactions, and alternatives were discussed as indicated. I advised her to contact the office if her condition worsens, changes or fails to improve as anticipated. She expressed understanding with the diagnosis(es) and plan. Jasmina Kitchen is a 62 y.o. female who was evaluated by a video visit encounter for concerns as above. Patient identification was verified prior to start of the visit. A caregiver was present when appropriate. Due to this being a TeleHealth encounter (During [facility] public health emergency), evaluation of the following organ systems was limited: Vitals/Constitutional/EENT/Resp/CV/GI//MS/Neuro/Skin/Heme-Lymph-Imm. Pursuant to the emergency declaration under the [facility], [facility] waiver authority and the [employer] and [employer] General Act, this Virtual  Visit was conducted, with patient's (and/or legal guardian's) consent, to reduce the patient's risk of exposure to COVID-19 and provide necessary medical care. Services were provided through a video synchronous discussion virtually to substitute for in-person clinic visit. Patient and provider were located at their individual homes.       Alyce Woodard MD

## 2020-07-02 ENCOUNTER — HOSPITAL ENCOUNTER (OUTPATIENT)
Dept: MAMMOGRAPHY | Age: 55
Discharge: HOME | End: 2020-07-02
Attending: INTERNAL MEDICINE
Payer: COMMERCIAL

## 2020-07-02 ENCOUNTER — HOSPITAL ENCOUNTER (OUTPATIENT)
Dept: ULTRASOUND IMAGING | Age: 55
Discharge: HOME | End: 2020-07-02
Attending: INTERNAL MEDICINE
Payer: COMMERCIAL

## 2020-07-02 DIAGNOSIS — N64.89 BREAST ASYMMETRY: ICD-10-CM

## 2020-07-02 DIAGNOSIS — R92.8 ABNORMALITY OF LEFT BREAST ON SCREENING MAMMOGRAM: ICD-10-CM

## 2020-07-02 DIAGNOSIS — R92.8 BI-RADS CATEGORY 3 MAMMOGRAM RESULT: ICD-10-CM

## 2020-07-02 PROCEDURE — 77061 BREAST TOMOSYNTHESIS UNI: CPT

## 2020-07-02 PROCEDURE — 76642 ULTRASOUND BREAST LIMITED: CPT

## 2020-08-05 DIAGNOSIS — E11.8 CONTROLLED TYPE 2 DIABETES MELLITUS WITH COMPLICATION, WITHOUT LONG-TERM CURRENT USE OF INSULIN (HCC): ICD-10-CM

## 2020-08-05 DIAGNOSIS — I10 ESSENTIAL HYPERTENSION: ICD-10-CM

## 2020-08-06 RX ORDER — METFORMIN HYDROCHLORIDE 500 MG/1
TABLET ORAL
Qty: 180 TAB | Refills: 0 | Status: SHIPPED | OUTPATIENT
Start: 2020-08-06 | End: 2020-11-11 | Stop reason: SDUPTHER

## 2020-08-06 RX ORDER — AMLODIPINE BESYLATE 10 MG/1
TABLET ORAL
Qty: 90 TAB | Refills: 0 | Status: SHIPPED | OUTPATIENT
Start: 2020-08-06 | End: 2020-11-11 | Stop reason: SDUPTHER

## 2020-08-06 NOTE — TELEPHONE ENCOUNTER
Please set pt up for diabetes f/u in Oct since we made changes in her meds. Just an A1c in the office.

## 2020-10-07 DIAGNOSIS — I10 ESSENTIAL HYPERTENSION: ICD-10-CM

## 2020-10-07 NOTE — TELEPHONE ENCOUNTER
Last refilled 4/14/20 for 90 with 1 .lisinopril 4/15/20 for 90 with 1 .  Last OV 6/10/20   Future Appointments   Date Time Provider Lenard Pena   10/21/2020  8:30 AM MD SHAY JohnsonMA BS AMB

## 2020-10-08 RX ORDER — LISINOPRIL 10 MG/1
10 TABLET ORAL DAILY
Qty: 90 TAB | Refills: 1 | Status: SHIPPED | OUTPATIENT
Start: 2020-10-08

## 2020-10-08 RX ORDER — ATORVASTATIN CALCIUM 40 MG/1
40 TABLET, FILM COATED ORAL DAILY
Qty: 90 TAB | Refills: 1 | Status: SHIPPED | OUTPATIENT
Start: 2020-10-08

## 2020-11-11 DIAGNOSIS — E11.8 CONTROLLED TYPE 2 DIABETES MELLITUS WITH COMPLICATION, WITHOUT LONG-TERM CURRENT USE OF INSULIN (HCC): ICD-10-CM

## 2020-11-11 DIAGNOSIS — I10 ESSENTIAL HYPERTENSION: ICD-10-CM

## 2020-11-11 RX ORDER — METFORMIN HYDROCHLORIDE 500 MG/1
TABLET ORAL
Qty: 180 TAB | Refills: 1 | Status: SHIPPED | OUTPATIENT
Start: 2020-11-11

## 2020-11-11 RX ORDER — AMLODIPINE BESYLATE 10 MG/1
TABLET ORAL
Qty: 90 TAB | Refills: 1 | Status: SHIPPED | OUTPATIENT
Start: 2020-11-11

## 2020-12-10 ENCOUNTER — OFFICE VISIT (OUTPATIENT)
Dept: FAMILY MEDICINE CLINIC | Age: 55
End: 2020-12-10
Payer: COMMERCIAL

## 2020-12-10 VITALS
BODY MASS INDEX: 29.81 KG/M2 | WEIGHT: 174.6 LBS | SYSTOLIC BLOOD PRESSURE: 144 MMHG | TEMPERATURE: 98.5 F | HEART RATE: 72 BPM | DIASTOLIC BLOOD PRESSURE: 82 MMHG | RESPIRATION RATE: 14 BRPM | HEIGHT: 64 IN | OXYGEN SATURATION: 100 %

## 2020-12-10 DIAGNOSIS — E11.8 CONTROLLED TYPE 2 DIABETES MELLITUS WITH COMPLICATION, WITHOUT LONG-TERM CURRENT USE OF INSULIN (HCC): Primary | ICD-10-CM

## 2020-12-10 DIAGNOSIS — I10 ESSENTIAL HYPERTENSION: ICD-10-CM

## 2020-12-10 DIAGNOSIS — Z23 NEEDS FLU SHOT: ICD-10-CM

## 2020-12-10 DIAGNOSIS — E78.5 DYSLIPIDEMIA: ICD-10-CM

## 2020-12-10 LAB — HBA1C MFR BLD HPLC: 5.5 %

## 2020-12-10 PROCEDURE — 99214 OFFICE O/P EST MOD 30 MIN: CPT | Performed by: INTERNAL MEDICINE

## 2020-12-10 PROCEDURE — 90471 IMMUNIZATION ADMIN: CPT | Performed by: INTERNAL MEDICINE

## 2020-12-10 PROCEDURE — 83036 HEMOGLOBIN GLYCOSYLATED A1C: CPT | Performed by: INTERNAL MEDICINE

## 2020-12-10 PROCEDURE — 90686 IIV4 VACC NO PRSV 0.5 ML IM: CPT | Performed by: INTERNAL MEDICINE

## 2020-12-10 NOTE — PATIENT INSTRUCTIONS
High Blood Pressure: Care Instructions Overview It's normal for blood pressure to go up and down throughout the day. But if it stays up, you have high blood pressure. Another name for high blood pressure is hypertension. Despite what a lot of people think, high blood pressure usually doesn't cause headaches or make you feel dizzy or lightheaded. It usually has no symptoms. But it does increase your risk of stroke, heart attack, and other problems. You and your doctor will talk about your risks of these problems based on your blood pressure. Your doctor will give you a goal for your blood pressure. Your goal will be based on your health and your age. Lifestyle changes, such as eating healthy and being active, are always important to help lower blood pressure. You might also take medicine to reach your blood pressure goal. 
Follow-up care is a key part of your treatment and safety. Be sure to make and go to all appointments, and call your doctor if you are having problems. It's also a good idea to know your test results and keep a list of the medicines you take. How can you care for yourself at home? Medical treatment · If you stop taking your medicine, your blood pressure will go back up. You may take one or more types of medicine to lower your blood pressure. Be safe with medicines. Take your medicine exactly as prescribed. Call your doctor if you think you are having a problem with your medicine. · Talk to your doctor before you start taking aspirin every day. Aspirin can help certain people lower their risk of a heart attack or stroke. But taking aspirin isn't right for everyone, because it can cause serious bleeding. · See your doctor regularly. You may need to see the doctor more often at first or until your blood pressure comes down. · If you are taking blood pressure medicine, talk to your doctor before you take decongestants or anti-inflammatory medicine, such as ibuprofen. Some of these medicines can raise blood pressure. · Learn how to check your blood pressure at home. Lifestyle changes · Stay at a healthy weight. This is especially important if you put on weight around the waist. Losing even 10 pounds can help you lower your blood pressure. · If your doctor recommends it, get more exercise. Walking is a good choice. Bit by bit, increase the amount you walk every day. Try for at least 30 minutes on most days of the week. You also may want to swim, bike, or do other activities. · Avoid or limit alcohol. Talk to your doctor about whether you can drink any alcohol. · Try to limit how much sodium you eat to less than 2,300 milligrams (mg) a day. Your doctor may ask you to try to eat less than 1,500 mg a day. · Eat plenty of fruits (such as bananas and oranges), vegetables, legumes, whole grains, and low-fat dairy products. · Lower the amount of saturated fat in your diet. Saturated fat is found in animal products such as milk, cheese, and meat. Limiting these foods may help you lose weight and also lower your risk for heart disease. · Do not smoke. Smoking increases your risk for heart attack and stroke. If you need help quitting, talk to your doctor about stop-smoking programs and medicines. These can increase your chances of quitting for good. When should you call for help? Call  911 anytime you think you may need emergency care. This may mean having symptoms that suggest that your blood pressure is causing a serious heart or blood vessel problem. Your blood pressure may be over 180/120. For example, call 911 if: 
  · You have symptoms of a heart attack. These may include: 
? Chest pain or pressure, or a strange feeling in the chest. 
? Sweating. ? Shortness of breath. ? Nausea or vomiting. ? Pain, pressure, or a strange feeling in the back, neck, jaw, or upper belly or in one or both shoulders or arms. ? Lightheadedness or sudden weakness. ? A fast or irregular heartbeat.  
  · You have symptoms of a stroke. These may include: 
? Sudden numbness, tingling, weakness, or loss of movement in your face, arm, or leg, especially on only one side of your body. ? Sudden vision changes. ? Sudden trouble speaking. ? Sudden confusion or trouble understanding simple statements. ? Sudden problems with walking or balance. ? A sudden, severe headache that is different from past headaches.  
  · You have severe back or belly pain. Do not wait until your blood pressure comes down on its own. Get help right away. Call your doctor now or seek immediate care if: 
  · Your blood pressure is much higher than normal (such as 180/120 or higher), but you don't have symptoms.  
  · You think high blood pressure is causing symptoms, such as: 
? Severe headache. 
? Blurry vision. Watch closely for changes in your health, and be sure to contact your doctor if: 
  · Your blood pressure measures higher than your doctor recommends at least 2 times. That means the top number is higher or the bottom number is higher, or both.  
  · You think you may be having side effects from your blood pressure medicine. Where can you learn more? Go to http://www.gray.com/ Enter B581 in the search box to learn more about \"High Blood Pressure: Care Instructions. \" Current as of: December 16, 2019               Content Version: 12.6 © 3357-1401 [employer]. Care instructions adapted under license by [employer] (which disclaims liability or warranty for this information). If you have questions about a medical condition or this instruction, always ask your healthcare professional. [place] [address] any warranty or liability for your use of this information.

## 2020-12-10 NOTE — PROGRESS NOTES
Cherie Marcelino is a 60 y.o. female (: 1965) presenting to address:    Chief Complaint   Patient presents with    Hypertension     follow up     Immunization/Injection     flu       Vitals:    12/10/20 0836   BP: (!) 172/92   Pulse: 72   Resp: 14   Temp: 98.5 °F (36.9 °C)   TempSrc: Temporal   SpO2: 100%   Weight: 174 lb 9.6 oz (79.2 kg)   Height: 5' 4\" (1.626 m)       Is someone accompanying this pt? NO    Is the patient using any DME equipment during OV? NO    Hearing/Vision:   No exam data present    Learning Assessment:     Learning Assessment 2018   PRIMARY LEARNER Patient   HIGHEST LEVEL OF EDUCATION - PRIMARY LEARNER  4 YEARS OF COLLEGE   BARRIERS PRIMARY LEARNER NONE   CO-LEARNER CAREGIVER No   PRIMARY LANGUAGE ENGLISH   LEARNER PREFERENCE PRIMARY DEMONSTRATION   ANSWERED BY patient   RELATIONSHIP SELF     Depression Screening:     3 most recent PHQ Screens 12/10/2020   Little interest or pleasure in doing things Not at all   Feeling down, depressed, irritable, or hopeless Not at all   Total Score PHQ 2 0     Fall Risk Assessment:     Fall Risk Assessment, last 12 mths 10/18/2019   Able to walk? Yes   Fall in past 12 months? No     Coordination of Care Questionaire:   1. Have you been to the ER, urgent care clinic since your last visit? Hospitalized since your last visit? NO    2. Have you seen or consulted any other health care providers outside of the [address] [place] [address] since your last visit? Include any pap smears or colon screening. NO    Advanced Directive:   1. Do you have an Advanced Directive? NO    2. Would you like information on Advanced Directives? NO    Immunization/s administered 12/10/2020 by Gracie Minaya with patient's consent. Patient tolerated procedure well. No reactions noted.

## 2020-12-10 NOTE — PROGRESS NOTES
Assessment/Plan:    *Diagnoses and all orders for this visit:    1. Controlled type 2 diabetes mellitus with complication, without long-term current use of insulin (HCC)  -     AMB POC HEMOGLOBIN A1C    2. Needs flu shot  -     INFLUENZA VIRUS VAC QUAD,SPLIT,PRESV FREE SYRINGE IM    3. Essential hypertension    4. Dyslipidemia      Will monitor her BP at home. F/u 4 months for HTN and DM. The plan was discussed with the patient. The patient verbalized understanding and is in agreement with the plan. All medication potential side effects were discussed with the patient.    -------------------------------------------------------------------------------------------------------------------        Napoleon Madrid is a 60 y.o. female and presents with Hypertension (follow up ) and Immunization/Injection (flu)         Subjective:  Pt seen for f/u. DM:  Doing very well, a1c 5.5% even though he weight has increased. HTN: borderline today. She has gained weight since her last visit. She needs to work on this. HLD:  Checked in June and levels have been great. Review of Systems - General ROS: negative         The problem list was updated as a part of today's visit. Patient Active Problem List   Diagnosis Code    Severe obesity ([facility] [address] E66.01    Essential hypertension I10    Controlled type 2 diabetes mellitus with complication, without long-term current use of insulin (HCC) E11.8    Dyslipidemia E78.5       The PSH, FH were reviewed.         SH:  Social History     Tobacco Use    Smoking status: Never Smoker    Smokeless tobacco: Never Used   Substance Use Topics    Alcohol use: No     Frequency: Never    Drug use: No       Medications/Allergies:  Current Outpatient Medications on File Prior to Visit   Medication Sig Dispense Refill    metFORMIN (GLUCOPHAGE) 500 mg tablet TAKE 1 TABLET BY MOUTH TWICE DAILY WITH MEALS 180 Tab 1    amLODIPine (NORVASC) 10 mg tablet TAKE 1 TABLET BY MOUTH EVERY MORNING 90 Tab 1    lisinopriL (PRINIVIL, ZESTRIL) 10 mg tablet Take 1 Tab by mouth daily. Indications: high blood pressure 90 Tab 1    atorvastatin (LIPITOR) 40 mg tablet Take 1 Tab by mouth daily. Indications: excessive fat in the blood 90 Tab 1    aspirin (ASPIRIN) 325 mg tablet Take 325 mg by mouth daily.  Blood-Glucose Meter (CONTOUR NEXT METER) misc Check blood sugar twice daily 1 Each 0    glucose blood VI test strips (CONTOUR NEXT TEST STRIPS) strip Check blood sugar twice daily 200 Strip 3     No current facility-administered medications on file prior to visit. No Known Allergies      Health Maintenance:   Health Maintenance   Topic Date Due    Foot Exam Q1  07/06/1973    PAP AKA CERVICAL CYTOLOGY  07/06/1984    Shingrix Vaccine Age 50> (1 of 2) 07/06/2013    Flu Vaccine (1) 09/01/2020    MICROALBUMIN Q1  10/18/2020    A1C test (Diabetic or Prediabetic)  06/08/2021    Lipid Screen  06/08/2021    Eye Exam Retinal or Dilated  11/01/2021    Breast Cancer Screen Mammogram  07/02/2022    Colorectal Cancer Screening Combo  11/01/2022    Bone Densitometry (Dexa) Screening  07/06/2028    DTaP/Tdap/Td series (2 - Td) 10/18/2029    Hepatitis C Screening  Completed    Pneumococcal 0-64 years  Completed       Objective:  Visit Vitals  BP (!) 144/82   Pulse 72   Temp 98.5 °F (36.9 °C) (Temporal)   Resp 14   Ht 5' 4\" (1.626 m)   Wt 174 lb 9.6 oz (79.2 kg)   SpO2 100%   BMI 29.97 kg/m²          Nurses notes and VS reviewed.       Physical Examination: General appearance - alert, well appearing, and in no distress  Chest - clear to auscultation, no wheezes, rales or rhonchi, symmetric air entry  Heart - normal rate, regular rhythm, normal S1, S2, no murmurs, rubs, clicks or gallops  Abdomen - soft, nontender, nondistended, no masses or organomegaly          Labwork and Ancillary Studies:    CBC w/Diff  Lab Results   Component Value Date/Time    WBC 6.8 10/18/2019 09:25 AM    HGB 13.2 10/18/2019 09:25 AM    PLATELET 150 09/94/6120 09:25 AM         Basic Metabolic Profile  Lab Results   Component Value Date/Time    Sodium 139 06/08/2020 03:22 PM    Potassium 4.2 06/08/2020 03:22 PM    Chloride 100 06/08/2020 03:22 PM    CO2 24 06/08/2020 03:22 PM    Anion gap 14.7 06/08/2020 03:22 PM    Glucose 161 (H) 06/08/2020 03:22 PM    BUN 12 06/08/2020 03:22 PM    Creatinine 0.6 06/08/2020 03:22 PM    Calcium 9.7 06/08/2020 03:22 PM         LFT  Lab Results   Component Value Date/Time    ALT (SGPT) 14 06/08/2020 03:22 PM    Alk.  phosphatase 63 06/08/2020 03:22 PM    Bilirubin, direct <0.2 06/08/2020 03:22 PM    Bilirubin, total 0.5 06/08/2020 03:22 PM         Cholesterol  Lab Results   Component Value Date/Time    Cholesterol, total 148 06/08/2020 03:22 PM    HDL Cholesterol 78 06/08/2020 03:22 PM    LDL, calculated 56 06/08/2020 03:22 PM    Triglyceride 73 06/08/2020 03:22 PM

## 2022-03-18 PROBLEM — I10 ESSENTIAL HYPERTENSION: Status: ACTIVE | Noted: 2019-01-07

## 2022-03-19 PROBLEM — E66.01 SEVERE OBESITY (HCC): Status: ACTIVE | Noted: 2018-11-29

## 2022-03-19 PROBLEM — E78.5 DYSLIPIDEMIA: Status: ACTIVE | Noted: 2019-01-07

## 2022-03-19 PROBLEM — E11.8 CONTROLLED TYPE 2 DIABETES MELLITUS WITH COMPLICATION, WITHOUT LONG-TERM CURRENT USE OF INSULIN (HCC): Status: ACTIVE | Noted: 2019-01-07

## 2023-05-23 RX ORDER — AMLODIPINE BESYLATE 10 MG/1
1 TABLET ORAL EVERY MORNING
COMMUNITY
Start: 2020-11-11

## 2023-05-23 RX ORDER — ATORVASTATIN CALCIUM 40 MG/1
40 TABLET, FILM COATED ORAL DAILY
COMMUNITY
Start: 2020-10-08

## 2023-05-23 RX ORDER — LISINOPRIL 10 MG/1
10 TABLET ORAL DAILY
COMMUNITY
Start: 2020-10-08

## 2023-05-23 RX ORDER — ASPIRIN 325 MG
325 TABLET ORAL DAILY
COMMUNITY